# Patient Record
Sex: MALE | Race: WHITE | NOT HISPANIC OR LATINO | Employment: FULL TIME | URBAN - METROPOLITAN AREA
[De-identification: names, ages, dates, MRNs, and addresses within clinical notes are randomized per-mention and may not be internally consistent; named-entity substitution may affect disease eponyms.]

---

## 2018-04-03 PROBLEM — I73.9 PVD (PERIPHERAL VASCULAR DISEASE): Chronic | Status: ACTIVE | Noted: 2018-04-03

## 2018-04-03 PROBLEM — E11.9 INSULIN DEPENDENT TYPE 2 DIABETES MELLITUS: Chronic | Status: ACTIVE | Noted: 2018-04-03

## 2018-04-03 PROBLEM — I10 ESSENTIAL HYPERTENSION: Chronic | Status: ACTIVE | Noted: 2018-04-03

## 2018-04-03 PROBLEM — Z79.4 INSULIN DEPENDENT TYPE 2 DIABETES MELLITUS: Chronic | Status: ACTIVE | Noted: 2018-04-03

## 2018-04-03 PROBLEM — E78.2 MIXED HYPERLIPIDEMIA: Chronic | Status: ACTIVE | Noted: 2018-04-03

## 2018-05-22 PROBLEM — E66.01 MORBID OBESITY: Status: ACTIVE | Noted: 2017-06-15

## 2018-05-22 PROBLEM — M54.50 LOW BACK PAIN: Status: ACTIVE | Noted: 2017-06-15

## 2018-05-22 PROBLEM — M51.369 DEGENERATION OF LUMBAR INTERVERTEBRAL DISC: Status: ACTIVE | Noted: 2017-06-21

## 2018-05-22 PROBLEM — M51.36 DEGENERATION OF LUMBAR INTERVERTEBRAL DISC: Status: ACTIVE | Noted: 2017-06-21

## 2018-09-07 PROBLEM — G62.9 NEUROPATHY: Chronic | Status: ACTIVE | Noted: 2018-09-07

## 2021-03-30 ENCOUNTER — IMMUNIZATION (OUTPATIENT)
Dept: PRIMARY CARE CLINIC | Facility: CLINIC | Age: 56
End: 2021-03-30
Payer: COMMERCIAL

## 2021-03-30 DIAGNOSIS — Z23 NEED FOR VACCINATION: Primary | ICD-10-CM

## 2021-03-30 PROCEDURE — 91301 PR SARS-COV-2 COVID-19 VACCINE, NO PRSV, 100MCG/0.5ML, IM: ICD-10-PCS | Mod: S$GLB,,, | Performed by: INTERNAL MEDICINE

## 2021-03-30 PROCEDURE — 91301 PR SARS-COV-2 COVID-19 VACCINE, NO PRSV, 100MCG/0.5ML, IM: CPT | Mod: S$GLB,,, | Performed by: INTERNAL MEDICINE

## 2021-03-30 PROCEDURE — 0011A PR IMMUNIZ ADMIN, SARS-COV-2 COVID-19 VACC, 100MCG/0.5ML, 1ST DOSE: CPT | Mod: CV19,S$GLB,, | Performed by: INTERNAL MEDICINE

## 2021-03-30 PROCEDURE — 0011A PR IMMUNIZ ADMIN, SARS-COV-2 COVID-19 VACC, 100MCG/0.5ML, 1ST DOSE: ICD-10-PCS | Mod: CV19,S$GLB,, | Performed by: INTERNAL MEDICINE

## 2021-03-30 RX ADMIN — Medication 0.5 ML: at 01:03

## 2021-04-28 ENCOUNTER — IMMUNIZATION (OUTPATIENT)
Dept: PRIMARY CARE CLINIC | Facility: CLINIC | Age: 56
End: 2021-04-28
Payer: COMMERCIAL

## 2021-04-28 DIAGNOSIS — Z23 NEED FOR VACCINATION: Primary | ICD-10-CM

## 2021-04-28 PROCEDURE — 0012A PR IMMUNIZ ADMIN, SARS-COV-2 COVID-19 VACC, 100MCG/0.5ML, 2ND DOSE: ICD-10-PCS | Mod: CV19,S$GLB,, | Performed by: INTERNAL MEDICINE

## 2021-04-28 PROCEDURE — 91301 PR SARS-COV-2 COVID-19 VACCINE, NO PRSV, 100MCG/0.5ML, IM: ICD-10-PCS | Mod: S$GLB,,, | Performed by: INTERNAL MEDICINE

## 2021-04-28 PROCEDURE — 0012A PR IMMUNIZ ADMIN, SARS-COV-2 COVID-19 VACC, 100MCG/0.5ML, 2ND DOSE: CPT | Mod: CV19,S$GLB,, | Performed by: INTERNAL MEDICINE

## 2021-04-28 PROCEDURE — 91301 PR SARS-COV-2 COVID-19 VACCINE, NO PRSV, 100MCG/0.5ML, IM: CPT | Mod: S$GLB,,, | Performed by: INTERNAL MEDICINE

## 2021-04-28 RX ADMIN — Medication 0.5 ML: at 06:04

## 2023-08-02 ENCOUNTER — LAB VISIT (OUTPATIENT)
Dept: LAB | Facility: HOSPITAL | Age: 58
End: 2023-08-02
Attending: GENERAL PRACTICE
Payer: COMMERCIAL

## 2023-08-02 DIAGNOSIS — L40.3 SAPHO SYNDROME: Primary | ICD-10-CM

## 2023-08-02 DIAGNOSIS — E11.621 DIABETIC FOOT ULCER WITH OSTEOMYELITIS: ICD-10-CM

## 2023-08-02 DIAGNOSIS — M65.9 SAPHO SYNDROME: Primary | ICD-10-CM

## 2023-08-02 DIAGNOSIS — L70.9 SAPHO SYNDROME: Primary | ICD-10-CM

## 2023-08-02 DIAGNOSIS — E11.69 DIABETIC FOOT ULCER WITH OSTEOMYELITIS: ICD-10-CM

## 2023-08-02 DIAGNOSIS — M85.80 SAPHO SYNDROME: Primary | ICD-10-CM

## 2023-08-02 DIAGNOSIS — L97.509 DIABETIC FOOT ULCER WITH OSTEOMYELITIS: ICD-10-CM

## 2023-08-02 DIAGNOSIS — M86.9 DIABETIC FOOT ULCER WITH OSTEOMYELITIS: ICD-10-CM

## 2023-08-02 DIAGNOSIS — M86.9 SAPHO SYNDROME: Primary | ICD-10-CM

## 2023-08-02 LAB
ALBUMIN SERPL BCP-MCNC: 2.7 G/DL (ref 3.5–5.2)
ALP SERPL-CCNC: 77 U/L (ref 55–135)
ALT SERPL W/O P-5'-P-CCNC: 12 U/L (ref 10–44)
ANION GAP SERPL CALC-SCNC: 10 MMOL/L (ref 8–16)
AST SERPL-CCNC: 17 U/L (ref 10–40)
BASOPHILS # BLD AUTO: 0.02 K/UL (ref 0–0.2)
BASOPHILS NFR BLD: 0.3 % (ref 0–1.9)
BILIRUB SERPL-MCNC: 0.3 MG/DL (ref 0.1–1)
BUN SERPL-MCNC: 38 MG/DL (ref 6–20)
CALCIUM SERPL-MCNC: 8.6 MG/DL (ref 8.7–10.5)
CHLORIDE SERPL-SCNC: 103 MMOL/L (ref 95–110)
CO2 SERPL-SCNC: 27 MMOL/L (ref 23–29)
CREAT SERPL-MCNC: 1.6 MG/DL (ref 0.5–1.4)
CRP SERPL-MCNC: 31.6 MG/L (ref 0–8.2)
DIFFERENTIAL METHOD: ABNORMAL
EOSINOPHIL # BLD AUTO: 0.3 K/UL (ref 0–0.5)
EOSINOPHIL NFR BLD: 5 % (ref 0–8)
ERYTHROCYTE [DISTWIDTH] IN BLOOD BY AUTOMATED COUNT: 15.9 % (ref 11.5–14.5)
EST. GFR  (NO RACE VARIABLE): 50 ML/MIN/1.73 M^2
GLUCOSE SERPL-MCNC: 110 MG/DL (ref 70–110)
HCT VFR BLD AUTO: 33.4 % (ref 40–54)
HGB BLD-MCNC: 9.9 G/DL (ref 14–18)
IMM GRANULOCYTES # BLD AUTO: 0.06 K/UL (ref 0–0.04)
IMM GRANULOCYTES NFR BLD AUTO: 1 % (ref 0–0.5)
LYMPHOCYTES # BLD AUTO: 1.3 K/UL (ref 1–4.8)
LYMPHOCYTES NFR BLD: 20.6 % (ref 18–48)
MCH RBC QN AUTO: 22.3 PG (ref 27–31)
MCHC RBC AUTO-ENTMCNC: 29.6 G/DL (ref 32–36)
MCV RBC AUTO: 75 FL (ref 82–98)
MONOCYTES # BLD AUTO: 0.8 K/UL (ref 0.3–1)
MONOCYTES NFR BLD: 12.2 % (ref 4–15)
NEUTROPHILS # BLD AUTO: 3.8 K/UL (ref 1.8–7.7)
NEUTROPHILS NFR BLD: 60.9 % (ref 38–73)
NRBC BLD-RTO: 0 /100 WBC
PLATELET # BLD AUTO: 169 K/UL (ref 150–450)
PMV BLD AUTO: 12 FL (ref 9.2–12.9)
POTASSIUM SERPL-SCNC: 5.1 MMOL/L (ref 3.5–5.1)
PROT SERPL-MCNC: 7.3 G/DL (ref 6–8.4)
RBC # BLD AUTO: 4.43 M/UL (ref 4.6–6.2)
SODIUM SERPL-SCNC: 140 MMOL/L (ref 136–145)
WBC # BLD AUTO: 6.22 K/UL (ref 3.9–12.7)

## 2023-08-02 PROCEDURE — 85025 COMPLETE CBC W/AUTO DIFF WBC: CPT | Performed by: GENERAL PRACTICE

## 2023-08-02 PROCEDURE — 36415 COLL VENOUS BLD VENIPUNCTURE: CPT | Performed by: GENERAL PRACTICE

## 2023-08-02 PROCEDURE — 86140 C-REACTIVE PROTEIN: CPT | Performed by: GENERAL PRACTICE

## 2023-08-02 PROCEDURE — 80053 COMPREHEN METABOLIC PANEL: CPT | Performed by: GENERAL PRACTICE

## 2023-08-07 ENCOUNTER — LAB VISIT (OUTPATIENT)
Dept: LAB | Facility: HOSPITAL | Age: 58
End: 2023-08-07
Attending: PODIATRIST
Payer: COMMERCIAL

## 2023-08-07 DIAGNOSIS — E11.610 DIABETIC NEUROGENIC ARTHROPATHY: ICD-10-CM

## 2023-08-07 DIAGNOSIS — Z47.89 UNSPECIFIED ORTHOPEDIC AFTERCARE: Primary | ICD-10-CM

## 2023-08-07 LAB
ANION GAP SERPL CALC-SCNC: 12 MMOL/L (ref 8–16)
BASOPHILS # BLD AUTO: 0.02 K/UL (ref 0–0.2)
BASOPHILS NFR BLD: 0.3 % (ref 0–1.9)
BUN SERPL-MCNC: 36 MG/DL (ref 6–20)
CALCIUM SERPL-MCNC: 9 MG/DL (ref 8.7–10.5)
CHLORIDE SERPL-SCNC: 103 MMOL/L (ref 95–110)
CO2 SERPL-SCNC: 26 MMOL/L (ref 23–29)
CREAT SERPL-MCNC: 1.5 MG/DL (ref 0.5–1.4)
CRP SERPL-MCNC: 13.6 MG/L (ref 0–8.2)
DIFFERENTIAL METHOD: ABNORMAL
EOSINOPHIL # BLD AUTO: 0.2 K/UL (ref 0–0.5)
EOSINOPHIL NFR BLD: 2.2 % (ref 0–8)
ERYTHROCYTE [DISTWIDTH] IN BLOOD BY AUTOMATED COUNT: 16.5 % (ref 11.5–14.5)
EST. GFR  (NO RACE VARIABLE): 54 ML/MIN/1.73 M^2
GLUCOSE SERPL-MCNC: 82 MG/DL (ref 70–110)
HCT VFR BLD AUTO: 36.3 % (ref 40–54)
HGB BLD-MCNC: 10.7 G/DL (ref 14–18)
IMM GRANULOCYTES # BLD AUTO: 0.09 K/UL (ref 0–0.04)
IMM GRANULOCYTES NFR BLD AUTO: 1.2 % (ref 0–0.5)
LYMPHOCYTES # BLD AUTO: 1.2 K/UL (ref 1–4.8)
LYMPHOCYTES NFR BLD: 16.4 % (ref 18–48)
MCH RBC QN AUTO: 22.3 PG (ref 27–31)
MCHC RBC AUTO-ENTMCNC: 29.5 G/DL (ref 32–36)
MCV RBC AUTO: 76 FL (ref 82–98)
MONOCYTES # BLD AUTO: 0.7 K/UL (ref 0.3–1)
MONOCYTES NFR BLD: 9.8 % (ref 4–15)
NEUTROPHILS # BLD AUTO: 5.1 K/UL (ref 1.8–7.7)
NEUTROPHILS NFR BLD: 70.1 % (ref 38–73)
NRBC BLD-RTO: 0 /100 WBC
PLATELET # BLD AUTO: 211 K/UL (ref 150–450)
PMV BLD AUTO: 11.2 FL (ref 9.2–12.9)
POTASSIUM SERPL-SCNC: 4.6 MMOL/L (ref 3.5–5.1)
RBC # BLD AUTO: 4.79 M/UL (ref 4.6–6.2)
SODIUM SERPL-SCNC: 141 MMOL/L (ref 136–145)
WBC # BLD AUTO: 7.25 K/UL (ref 3.9–12.7)

## 2023-08-07 PROCEDURE — 85025 COMPLETE CBC W/AUTO DIFF WBC: CPT | Performed by: PODIATRIST

## 2023-08-07 PROCEDURE — 86140 C-REACTIVE PROTEIN: CPT | Performed by: PODIATRIST

## 2023-08-07 PROCEDURE — 36415 COLL VENOUS BLD VENIPUNCTURE: CPT | Performed by: PODIATRIST

## 2023-08-07 PROCEDURE — 80048 BASIC METABOLIC PNL TOTAL CA: CPT | Performed by: PODIATRIST

## 2023-09-03 PROBLEM — J15.9 BACTERIAL PNEUMONIA: Status: ACTIVE | Noted: 2023-09-03

## 2023-09-04 PROBLEM — J90 BILATERAL PLEURAL EFFUSION: Status: ACTIVE | Noted: 2023-09-04

## 2023-09-04 PROBLEM — I50.33 ACUTE ON CHRONIC DIASTOLIC HEART FAILURE: Status: ACTIVE | Noted: 2023-09-04

## 2023-09-04 PROBLEM — J96.01 ACUTE RESPIRATORY FAILURE WITH HYPOXIA AND HYPERCAPNIA: Status: ACTIVE | Noted: 2023-09-04

## 2023-09-04 PROBLEM — J96.02 ACUTE RESPIRATORY FAILURE WITH HYPOXIA AND HYPERCAPNIA: Status: ACTIVE | Noted: 2023-09-04

## 2023-09-04 PROBLEM — I50.9 NEW ONSET OF CONGESTIVE HEART FAILURE: Status: ACTIVE | Noted: 2023-09-04

## 2023-09-05 PROBLEM — I50.33 ACUTE ON CHRONIC HEART FAILURE WITH PRESERVED EJECTION FRACTION (HFPEF): Status: ACTIVE | Noted: 2023-09-05

## 2023-09-05 PROBLEM — I47.29 NSVT (NONSUSTAINED VENTRICULAR TACHYCARDIA): Status: ACTIVE | Noted: 2023-09-05

## 2023-09-06 PROBLEM — J96.22 ACUTE ON CHRONIC RESPIRATORY FAILURE WITH HYPOXIA AND HYPERCAPNIA: Status: ACTIVE | Noted: 2023-09-06

## 2023-09-06 PROBLEM — I47.29 NSVT (NONSUSTAINED VENTRICULAR TACHYCARDIA): Status: RESOLVED | Noted: 2023-09-05 | Resolved: 2023-09-06

## 2023-09-06 PROBLEM — J90 BILATERAL PLEURAL EFFUSION: Status: RESOLVED | Noted: 2023-09-04 | Resolved: 2023-09-06

## 2023-09-06 PROBLEM — I50.33 ACUTE ON CHRONIC DIASTOLIC HEART FAILURE: Status: RESOLVED | Noted: 2023-09-04 | Resolved: 2023-09-06

## 2023-09-06 PROBLEM — J96.21 ACUTE ON CHRONIC RESPIRATORY FAILURE WITH HYPOXIA AND HYPERCAPNIA: Status: ACTIVE | Noted: 2023-09-06

## 2023-09-06 PROBLEM — J96.01 ACUTE RESPIRATORY FAILURE WITH HYPOXIA AND HYPERCAPNIA: Status: RESOLVED | Noted: 2023-09-04 | Resolved: 2023-09-06

## 2023-09-06 PROBLEM — J96.02 ACUTE RESPIRATORY FAILURE WITH HYPOXIA AND HYPERCAPNIA: Status: RESOLVED | Noted: 2023-09-04 | Resolved: 2023-09-06

## 2023-09-07 ENCOUNTER — DOCUMENT SCAN (OUTPATIENT)
Dept: HOME HEALTH SERVICES | Facility: HOSPITAL | Age: 58
End: 2023-09-07
Payer: COMMERCIAL

## 2023-09-07 ENCOUNTER — PATIENT OUTREACH (OUTPATIENT)
Dept: ADMINISTRATIVE | Facility: CLINIC | Age: 58
End: 2023-09-07
Payer: COMMERCIAL

## 2023-09-07 NOTE — PROGRESS NOTES
C3 nurse spoke with Farhan Crenshaw  for a TCC post hospital discharge follow up call. The patient does not have a scheduled HOSFU appointment with Rosa Michael MD  within 5-7 days post hospital discharge date 09/06/2023. C3 nurse was unable to schedule HOSFU appointment in [facility].    Message sent to PCP Staff.

## 2023-12-04 PROBLEM — J15.9 BACTERIAL PNEUMONIA: Status: RESOLVED | Noted: 2023-09-03 | Resolved: 2023-12-04

## 2023-12-11 PROBLEM — J96.22 ACUTE ON CHRONIC RESPIRATORY FAILURE WITH HYPOXIA AND HYPERCAPNIA: Status: RESOLVED | Noted: 2023-09-06 | Resolved: 2023-12-11

## 2023-12-11 PROBLEM — J96.21 ACUTE ON CHRONIC RESPIRATORY FAILURE WITH HYPOXIA AND HYPERCAPNIA: Status: RESOLVED | Noted: 2023-09-06 | Resolved: 2023-12-11

## 2023-12-17 PROBLEM — R79.89 ELEVATED TROPONIN: Status: ACTIVE | Noted: 2023-12-17

## 2023-12-17 PROBLEM — I16.1 HYPERTENSIVE EMERGENCY: Status: ACTIVE | Noted: 2023-12-17

## 2023-12-18 PROBLEM — I50.31 ACUTE DIASTOLIC HEART FAILURE: Status: ACTIVE | Noted: 2023-12-18

## 2024-01-05 ENCOUNTER — OFFICE VISIT (OUTPATIENT)
Dept: CARDIOLOGY | Facility: CLINIC | Age: 59
End: 2024-01-05
Payer: COMMERCIAL

## 2024-01-05 VITALS
BODY MASS INDEX: 41.75 KG/M2 | SYSTOLIC BLOOD PRESSURE: 140 MMHG | HEART RATE: 74 BPM | WEIGHT: 315 LBS | DIASTOLIC BLOOD PRESSURE: 70 MMHG | HEIGHT: 73 IN | OXYGEN SATURATION: 95 %

## 2024-01-05 DIAGNOSIS — R94.39 ABNORMAL CARDIOVASCULAR STRESS TEST: ICD-10-CM

## 2024-01-05 DIAGNOSIS — I50.32 CHRONIC HEART FAILURE WITH PRESERVED EJECTION FRACTION: ICD-10-CM

## 2024-01-05 DIAGNOSIS — E78.2 MIXED HYPERLIPIDEMIA: Chronic | ICD-10-CM

## 2024-01-05 DIAGNOSIS — I73.9 PVD (PERIPHERAL VASCULAR DISEASE): Primary | Chronic | ICD-10-CM

## 2024-01-05 DIAGNOSIS — R79.89 ELEVATED TROPONIN: ICD-10-CM

## 2024-01-05 DIAGNOSIS — I10 ESSENTIAL HYPERTENSION: Chronic | ICD-10-CM

## 2024-01-05 DIAGNOSIS — N18.32 STAGE 3B CHRONIC KIDNEY DISEASE: ICD-10-CM

## 2024-01-05 PROCEDURE — 1159F MED LIST DOCD IN RCRD: CPT | Mod: CPTII,S$GLB,, | Performed by: INTERNAL MEDICINE

## 2024-01-05 PROCEDURE — 3078F DIAST BP <80 MM HG: CPT | Mod: CPTII,S$GLB,, | Performed by: INTERNAL MEDICINE

## 2024-01-05 PROCEDURE — 3008F BODY MASS INDEX DOCD: CPT | Mod: CPTII,S$GLB,, | Performed by: INTERNAL MEDICINE

## 2024-01-05 PROCEDURE — 99215 OFFICE O/P EST HI 40 MIN: CPT | Mod: S$GLB,,, | Performed by: INTERNAL MEDICINE

## 2024-01-05 PROCEDURE — 3077F SYST BP >= 140 MM HG: CPT | Mod: CPTII,S$GLB,, | Performed by: INTERNAL MEDICINE

## 2024-01-05 PROCEDURE — 1160F RVW MEDS BY RX/DR IN RCRD: CPT | Mod: CPTII,S$GLB,, | Performed by: INTERNAL MEDICINE

## 2024-01-05 PROCEDURE — 99999 PR PBB SHADOW E&M-EST. PATIENT-LVL V: CPT | Mod: PBBFAC,,, | Performed by: INTERNAL MEDICINE

## 2024-01-05 PROCEDURE — 1111F DSCHRG MED/CURRENT MED MERGE: CPT | Mod: CPTII,S$GLB,, | Performed by: INTERNAL MEDICINE

## 2024-01-05 NOTE — PROGRESS NOTES
[facility] [address]  Cardiology Clinic Note      Chief Complaint  Chief Complaint   Patient presents with    [facility]       HPI:      58-year-old male with past medical history presumed CKD though the creatinine values we have our probably all from hospitalizations, hypertension, hyperlipidemia, diabetes, peripheral vascular disease no documentation, no evidence of renal artery stenosis ultrasound 2018, NSVT during last hospital stay, MPI 04/2022 no ischemia possible inferolateral scar, coronary artery calcification by imaging 2022, HFpEF echocardiogram 12/18/2023 normal EF mild LVH grade 2 diastolic dysfunction moderate left atrial dilation aortic valve sclerosis/mild stenosis mean gradient 10 peak velocity 2.2 normal RV prior echo 09/2023 EF 50-55% severe LVH diastolic function indeterminate mild aortic valve sclerosis/stenosis peak velocity 2 mean gradient 9 RV not well seen outside echocardiogram 02/2023 normal EF mild LVH mild-to-moderate left atrial enlargement    Admitted for acute on hypertensive urgency/emergency with acute on chronic chronic diastolic heart failure 12/2023 complicated by demand ischemia  Note made of apparent lead abnormal MPI in the past  Patient feels improved though still suffers with lymphedema    Medications  Current Outpatient Medications   Medication Sig Dispense Refill    albuterol (VENTOLIN HFA) 90 mcg/actuation inhaler Inhale 2 puffs into the lungs every 6 (six) hours as needed for Wheezing or Shortness of Breath. Rescue 1 g 2    amLODIPine (NORVASC) 10 MG tablet Take 10 mg by mouth once daily.      aspirin (ECOTRIN) 81 MG EC tablet TAKE 1 TABLET(S) EVERY DAY BY ORAL ROUTE.  11    atorvastatin (LIPITOR) 80 MG tablet TAKE 1 TABLET BY MOUTH EVERY DAY 15 tablet 0    BASAGLAR KWIKPEN U-100 INSULIN glargine 100 units/mL (3mL) SubQ pen INJECT 20 UNITS INTO THE SKIN ONCE DAILY. (Patient taking differently: 20 Units 2 (two) times a day.) 15 Syringe 0    carvediloL  "(COREG) 25 MG tablet Take 25 mg by mouth 2 (two) times daily.      furosemide (LASIX) 80 MG tablet Take 1 tablet (80 mg total) by mouth once daily. 90 tablet 0    hydrALAZINE (APRESOLINE) 25 MG tablet Take 25 mg by mouth 2 (two) times a day.      isosorbide dinitrate (ISORDIL) 20 MG tablet Take 20 mg by mouth 2 (two) times daily.      lisinopriL (PRINIVIL,ZESTRIL) 40 MG tablet Take 40 mg by mouth once daily.      metFORMIN (GLUCOPHAGE) 1000 MG tablet Take 1,000 mg by mouth 2 (two) times daily with meals.      BD ULTRA-FINE KAMAR PEN NEEDLE 32 gauge x 5/32" Ndle USE TO INJECT BASAGLAR ONCE A  each 2     No current facility-administered medications for this visit.        History  Past Medical History:   Diagnosis Date    Acute respiratory failure 12/17/2023    Arthritis     Bacterial pneumonia 09/2023    CHF (congestive heart failure)     Diabetes mellitus, type 2     Hyperlipidemia     Hypertension      Past Surgical History:   Procedure Laterality Date    ADENOIDECTOMY  1975    APPENDECTOMY  1995    TONSILLECTOMY  1975     Social History     Socioeconomic History    Marital status:     Number of children: 2   Occupational History    Occupation: Retail Sales    Tobacco Use    Smoking status: Never    Smokeless tobacco: Never   Substance and Sexual Activity    Alcohol use: No    Drug use: No     Social Determinants of Health     Financial Resource Strain: Low Risk  (12/19/2023)    Overall Financial Resource Strain (CARDIA)     Difficulty of Paying Living Expenses: Not very hard   Food Insecurity: No Food Insecurity (12/19/2023)    Hunger Vital Sign     Worried About Running Out of Food in the Last Year: Never true     Ran Out of Food in the Last Year: Never true   Transportation Needs: No Transportation Needs (12/19/2023)    PRAPARE - Transportation     Lack of Transportation (Medical): No     Lack of Transportation (Non-Medical): No   Stress: No Stress Concern Present (12/19/2023)    [facility] " of Occupational Health - Occupational Stress Questionnaire     Feeling of Stress : Only a little   Social Connections: Moderately Integrated (12/19/2023)    Social Connection and Isolation Panel [NHANES]     Frequency of Communication with Friends and Family: Three times a week     Frequency of Social Gatherings with Friends and Family: Three times a week     Attends Sikh Services: 1 to 4 times per year     Active Member of Clubs or Organizations: Yes     Attends Club or Organization Meetings: 1 to 4 times per year     Marital Status:    Housing Stability: Low Risk  (12/19/2023)    Housing Stability Vital Sign     Unable to Pay for Housing in the Last Year: No     Number of Places Lived in the Last Year: 1     Unstable Housing in the Last Year: No     Family History   Problem Relation Age of Onset    Arthritis Mother     Cancer Mother         lymph node     Hypertension Mother     Cancer Father         lung        Allergies  Review of patient's allergies indicates:  No Known Allergies    Review of Systems   Review of Systems   Constitutional: Negative for fever.   HENT:  Negative for nosebleeds.    Eyes:  Negative for visual disturbance.   Cardiovascular:  Positive for leg swelling. Negative for chest pain, claudication, dyspnea on exertion, palpitations and syncope.   Respiratory:  Negative for cough, hemoptysis and wheezing.    Endocrine: Negative for cold intolerance, heat intolerance, polyphagia and polyuria.   Hematologic/Lymphatic: Negative for bleeding problem.   Skin:  Negative for rash.   Musculoskeletal:  Negative for myalgias.   Gastrointestinal:  Negative for hematemesis, hematochezia, nausea and vomiting.   Genitourinary:  Negative for dysuria.   Neurological:  Negative for focal weakness and sensory change.   Psychiatric/Behavioral:  Negative for altered mental status.        Physical Exam  Vitals:    01/05/24 1337   BP: (!) 140/70   Pulse: 74     Wt Readings from Last 1 Encounters:    01/05/24 (!) 167.8 kg (370 lb)     Physical Exam  HENT:      Head: Normocephalic and atraumatic.      Mouth/Throat:      Mouth: Mucous membranes are moist.   Eyes:      Extraocular Movements: Extraocular movements intact.      Pupils: Pupils are equal, round, and reactive to light.   Neck:      Vascular: No carotid bruit or JVD.   Cardiovascular:      Rate and Rhythm: Normal rate and regular rhythm.      Pulses: Normal pulses and intact distal pulses.      Heart sounds: Normal heart sounds. No murmur heard.     No friction rub. No gallop.   Pulmonary:      Effort: Pulmonary effort is normal.      Breath sounds: Normal breath sounds.   Abdominal:      Tenderness: There is no abdominal tenderness. There is no guarding or rebound.   Musculoskeletal:      Right lower leg: Edema present.      Left lower leg: Edema present.   Skin:     General: Skin is warm and dry.      Capillary Refill: Capillary refill takes less than 2 seconds.   Neurological:      General: No focal deficit present.      Mental Status: He is alert and oriented to person, place, and time.   Psychiatric:         Mood and Affect: Mood normal.         Labs  No results displayed because visit has over 200 results.      No results displayed because visit has over 200 results.      Lab Visit on 08/07/2023   Component Date Value Ref Range Status    Sodium 08/07/2023 141  136 - 145 mmol/L Final    Potassium 08/07/2023 4.6  3.5 - 5.1 mmol/L Final    Chloride 08/07/2023 103  95 - 110 mmol/L Final    CO2 08/07/2023 26  23 - 29 mmol/L Final    Glucose 08/07/2023 82  70 - 110 mg/dL Final    BUN 08/07/2023 36 (H)  6 - 20 mg/dL Final    Creatinine 08/07/2023 1.5 (H)  0.5 - 1.4 mg/dL Final    Calcium 08/07/2023 9.0  8.7 - 10.5 mg/dL Final    Anion Gap 08/07/2023 12  8 - 16 mmol/L Final    eGFR 08/07/2023 54 (A)  >60 mL/min/1.73 m^2 Final    WBC 08/07/2023 7.25  3.90 - 12.70 K/uL Final    RBC 08/07/2023 4.79  4.60 - 6.20 M/uL Final    Hemoglobin 08/07/2023 10.7 (L)   14.0 - 18.0 g/dL Final    Hematocrit 08/07/2023 36.3 (L)  40.0 - 54.0 % Final    MCV 08/07/2023 76 (L)  82 - 98 fL Final    MCH 08/07/2023 22.3 (L)  27.0 - 31.0 pg Final    MCHC 08/07/2023 29.5 (L)  32.0 - 36.0 g/dL Final    RDW 08/07/2023 16.5 (H)  11.5 - 14.5 % Final    Platelets 08/07/2023 211  150 - 450 K/uL Final    MPV 08/07/2023 11.2  9.2 - 12.9 fL Final    Immature Granulocytes 08/07/2023 1.2 (H)  0.0 - 0.5 % Final    Gran # (ANC) 08/07/2023 5.1  1.8 - 7.7 K/uL Final    Immature Grans (Abs) 08/07/2023 0.09 (H)  0.00 - 0.04 K/uL Final    Comment: Mild elevation in immature granulocytes is non specific and   can be seen in a variety of conditions including stress response,   acute inflammation, trauma and pregnancy. Correlation with other   laboratory and clinical findings is essential.      Lymph # 08/07/2023 1.2  1.0 - 4.8 K/uL Final    Mono # 08/07/2023 0.7  0.3 - 1.0 K/uL Final    Eos # 08/07/2023 0.2  0.0 - 0.5 K/uL Final    Baso # 08/07/2023 0.02  0.00 - 0.20 K/uL Final    nRBC 08/07/2023 0  0 /100 WBC Final    Gran % 08/07/2023 70.1  38.0 - 73.0 % Final    Lymph % 08/07/2023 16.4 (L)  18.0 - 48.0 % Final    Mono % 08/07/2023 9.8  4.0 - 15.0 % Final    Eosinophil % 08/07/2023 2.2  0.0 - 8.0 % Final    Basophil % 08/07/2023 0.3  0.0 - 1.9 % Final    Differential Method 08/07/2023 Automated   Final    CRP 08/07/2023 13.6 (H)  0.0 - 8.2 mg/L Final   Lab Visit on 08/02/2023   Component Date Value Ref Range Status    WBC 08/02/2023 6.22  3.90 - 12.70 K/uL Final    RBC 08/02/2023 4.43 (L)  4.60 - 6.20 M/uL Final    Hemoglobin 08/02/2023 9.9 (L)  14.0 - 18.0 g/dL Final    Hematocrit 08/02/2023 33.4 (L)  40.0 - 54.0 % Final    MCV 08/02/2023 75 (L)  82 - 98 fL Final    MCH 08/02/2023 22.3 (L)  27.0 - 31.0 pg Final    MCHC 08/02/2023 29.6 (L)  32.0 - 36.0 g/dL Final    RDW 08/02/2023 15.9 (H)  11.5 - 14.5 % Final    Platelets 08/02/2023 169  150 - 450 K/uL Final    MPV 08/02/2023 12.0  9.2 - 12.9 fL Final     Immature Granulocytes 08/02/2023 1.0 (H)  0.0 - 0.5 % Final    Gran # (ANC) 08/02/2023 3.8  1.8 - 7.7 K/uL Final    Immature Grans (Abs) 08/02/2023 0.06 (H)  0.00 - 0.04 K/uL Final    Comment: Mild elevation in immature granulocytes is non specific and   can be seen in a variety of conditions including stress response,   acute inflammation, trauma and pregnancy. Correlation with other   laboratory and clinical findings is essential.      Lymph # 08/02/2023 1.3  1.0 - 4.8 K/uL Final    Mono # 08/02/2023 0.8  0.3 - 1.0 K/uL Final    Eos # 08/02/2023 0.3  0.0 - 0.5 K/uL Final    Baso # 08/02/2023 0.02  0.00 - 0.20 K/uL Final    nRBC 08/02/2023 0  0 /100 WBC Final    Gran % 08/02/2023 60.9  38.0 - 73.0 % Final    Lymph % 08/02/2023 20.6  18.0 - 48.0 % Final    Mono % 08/02/2023 12.2  4.0 - 15.0 % Final    Eosinophil % 08/02/2023 5.0  0.0 - 8.0 % Final    Basophil % 08/02/2023 0.3  0.0 - 1.9 % Final    Differential Method 08/02/2023 Automated   Final    Sodium 08/02/2023 140  136 - 145 mmol/L Final    Potassium 08/02/2023 5.1  3.5 - 5.1 mmol/L Final    Chloride 08/02/2023 103  95 - 110 mmol/L Final    CO2 08/02/2023 27  23 - 29 mmol/L Final    Glucose 08/02/2023 110  70 - 110 mg/dL Final    BUN 08/02/2023 38 (H)  6 - 20 mg/dL Final    Creatinine 08/02/2023 1.6 (H)  0.5 - 1.4 mg/dL Final    Calcium 08/02/2023 8.6 (L)  8.7 - 10.5 mg/dL Final    Total Protein 08/02/2023 7.3  6.0 - 8.4 g/dL Final    Albumin 08/02/2023 2.7 (L)  3.5 - 5.2 g/dL Final    Total Bilirubin 08/02/2023 0.3  0.1 - 1.0 mg/dL Final    Comment: For infants and newborns, interpretation of results should be based  on gestational age, weight and in agreement with clinical  observations.    Premature Infant recommended reference ranges:  Up to 24 hours.............<8.0 mg/dL  Up to 48 hours............<12.0 mg/dL  3-5 days..................<15.0 mg/dL  6-29 days.................<15.0 mg/dL      Alkaline Phosphatase 08/02/2023 77  55 - 135 U/L Final    AST  08/02/2023 17  10 - 40 U/L Final    ALT 08/02/2023 12  10 - 44 U/L Final    eGFR 08/02/2023 50 (A)  >60 mL/min/1.73 m^2 Final    Anion Gap 08/02/2023 10  8 - 16 mmol/L Final    CRP 08/02/2023 31.6 (H)  0.0 - 8.2 mg/L Final       EKG  12/2023 EKG normal sinus rhythm possible PAC with aberrant conduction left atrial enlargement nonspecific ST-T changes abnormal precordial R-wave progression overall poor quality tracing     Echo   Results for orders placed or performed during the hospital encounter of 12/17/23   Echo   Result Value Ref Range    BSA 3 m2    LVOT stroke volume 83.24 cm3    LVIDd 4.76 3.5 - 6.0 cm    LV Systolic Volume 22.87 mL    LV Systolic Volume Index 8.1 mL/m2    LVIDs 2.52 2.1 - 4.0 cm    LV Diastolic Volume 105.54 mL    LV Diastolic Volume Index 37.16 mL/m2    IVS 1.4 (A) 0.6 - 1.1 cm    LVOT diameter 2.35 cm    LVOT area 4.3 cm2    FS 47 (A) 28 - 44 %    Left Ventricle Relative Wall Thickness 0.59 cm    Posterior Wall 1.4 (A) 0.6 - 1.1 cm    LV mass 270.36 g    LV Mass Index 95 g/m2    MV Peak E Robin 1.16 m/s    TDI LATERAL 0.09 m/s    TDI SEPTAL 0.10 m/s    E/E' ratio 12.21 m/s    MV Peak A Robin 0.67 m/s    TR Max Robin 1.77 m/s    E/A ratio 1.73     E wave deceleration time 162.38 msec    LV SEPTAL E/E' RATIO 11.60 m/s    LV LATERAL E/E' RATIO 12.89 m/s    LVOT peak robin 0.83 m/s    Left Ventricular Outflow Tract Mean Velocity 0.63 cm/s    Left Ventricular Outflow Tract Mean Gradient 1.78 mmHg    RVDD 2.33 cm    LA size 4.24 cm    AV mean gradient 10 mmHg    AV peak gradient 19 mmHg    Ao peak robin 2.17 m/s    Ao VTI 47.60 cm    LVOT peak VTI 19.20 cm    AV valve area 1.75 cm²    AV Velocity Ratio 0.38     AV index (prosthetic) 0.40     FRANCOISE by Velocity Ratio 1.66 cm²    MV mean gradient 3 mmHg    MV peak gradient 7 mmHg    MV stenosis pressure 1/2 time 47.09 ms    MV valve area p 1/2 method 4.67 cm2    MV valve area by continuity eq 2.41 cm2    MV VTI 34.6 cm    Triscuspid Valve Regurgitation Peak  Gradient 13 mmHg    PV PEAK VELOCITY 1.15 m/s    PV peak gradient 5 mmHg    Pulmonary Valve Mean Velocity 0.78 m/s    Ao root annulus 3.49 cm    STJ 2.94 cm    Ascending aorta 3.33 cm    IVC diameter 1.89 cm    Mean e' 0.10 m/s    ZLVIDS -18.10     ZLVIDD -22.18     AORTIC VALVE CUSP SEPERATION 1.51 cm    TV resting pulmonary artery pressure 16 mmHg    RV TB RVSP 5 mmHg    Est. RA pres 3 mmHg    EF 60 %    Narrative      Left Ventricle: The left ventricle is normal in size. Increased   ventricular mass. Mildly increased wall thickness. There is mild   concentric hypertrophy. Normal wall motion. There is normal systolic   function. Ejection fraction by visual approximation is 60%. Grade II   diastolic dysfunction. Normal left ventricular filling pressure. Tissue   Doppler velocity is reduced. MV e' lateral velocity is 0.10 m/s. Average   E/e' ratio is 12.21.    Right Ventricle: Normal right ventricular cavity size. Wall thickness   is normal. Right ventricle wall motion  is normal. Systolic function is   normal.    Left Atrium: Left atrium is moderately dilated.    Aortic Valve: The aortic valve is a trileaflet valve. There is moderate   aortic valve sclerosis. Moderately restricted motion.    Mitral Valve: There is mild posterior leaflet sclerosis.    IVC/SVC: Normal venous pressure at 3 mmHg.         Imaging  US Retroperitoneal Complete    Result Date: 12/19/2023  EXAMINATION: US RETROPERITONEAL COMPLETE CLINICAL HISTORY: Renal dysfunction; TECHNIQUE: Ultrasound of the kidneys and urinary bladder was performed including color flow and Doppler evaluation of the kidneys. COMPARISON: None. FINDINGS: Right kidney: The right kidney measures 12.7 cm in length.  No cortical thinning. No loss of corticomedullary distinction. Resistive index measures 0.75.  There is a 6 mm cyst within the mid to lower right kidney.  No solid renal masses are identified.  No renal stone. No hydronephrosis. Left kidney: The left kidney  measures 13.1 cm in length.  No cortical thinning. No loss of corticomedullary distinction. Resistive index measures 0.81.  No mass. There are a couple of subcentimeter echogenic foci within the upper pole of the left kidney measuring up to 5 mm in size which may represent nonobstructing calculi.  No hydronephrosis. The bladder is partially distended at the time of scanning and has an unremarkable appearance.  Prevoid bladder measurements suggest a prevoid bladder volume of 197 mL. Incidental note is made of an elongated right lobe of the liver measuring up to 22.2 cm in length.  A right pleural effusion is also present.     Subcentimeter right renal cyst. Possible nonobstructing subcentimeter left renal calculi. Right pleural effusion. Hepatomegaly with elongated right lobe of the liver. Electronically signed by: Catalino Santizo MD Date:    12/19/2023 Time:    09:08    Echo    Result Date: 12/18/2023    Left Ventricle: The left ventricle is normal in size. Increased ventricular mass. Mildly increased wall thickness. There is mild concentric hypertrophy. Normal wall motion. There is normal systolic function. Ejection fraction by visual approximation is 60%. Grade II diastolic dysfunction. Normal left ventricular filling pressure. Tissue Doppler velocity is reduced. MV e' lateral velocity is 0.10 m/s. Average E/e' ratio is 12.21.   Right Ventricle: Normal right ventricular cavity size. Wall thickness is normal. Right ventricle wall motion  is normal. Systolic function is normal.   Left Atrium: Left atrium is moderately dilated.   Aortic Valve: The aortic valve is a trileaflet valve. There is moderate aortic valve sclerosis. Moderately restricted motion.   Mitral Valve: There is mild posterior leaflet sclerosis.   IVC/SVC: Normal venous pressure at 3 mmHg.     X-Ray Chest AP Portable    Result Date: 12/17/2023  EXAMINATION: XR CHEST AP PORTABLE CLINICAL HISTORY: Sepsis; TECHNIQUE: Single frontal view of the chest was  performed. COMPARISON: Chest radiograph and CTA chest 09/03/2023. FINDINGS: Cardiac monitoring leads overlie the chest.  The lung once again demonstrates multifocal patchy and consolidative opacities with more confluent opacification of the right mid and lower lung zones.  Similar findings were noted on prior radiographic exams referenced above noting the overall extent of opacification appears slightly progressed in the right mid lower lung zones.  No evidence of new or enlarging pneumothorax.  Overall, findings highly concerning for underlying multifocal infectious process.  Osseous structures are intact. This report was flagged in Epic as abnormal.     Please see above. Electronically signed by: Lyssa Day MD Date:    12/17/2023 Time:    05:18      Prior coronary angiogram / intervention:  See HPI    Assessment and Plan  1. PVD (peripheral vascular disease)  Consider lower extremity arterial duplex next visit if unable to find records    2. Mixed hyperlipidemia  Continue statin    3. Essential hypertension  Continue amlodipine, carvedilol, furosemide, hydralazine, nitrate  Blood pressure log call me if systolic greater than 140 can make hydralazine t.i.d. if necessary    4. Elevated troponin  Likely demand in the hospital    5. Abnormal cardiovascular stress test  Noted on prior study the patient is on aspirin 81 and Lipitor 80 asymptomatic  Can consider repeat in the future with symptoms    6. Stage 3b chronic kidney disease  Refer to nephrology  - Ambulatory referral/consult to Nephrology; Future    7. HFpEF  Wt Readings from Last 3 Encounters:   01/05/24 1337 (!) 167.8 kg (370 lb)   12/18/23 1300 (!) 174.6 kg (385 lb)   12/17/23 0519 (!) 174.7 kg (385 lb 3.2 oz)   12/17/23 0445 (!) 170.1 kg (375 lb)   09/05/23 0754 (!) 160.1 kg (353 lb)   09/04/23 0203 (!) 160.5 kg (353 lb 13.4 oz)   09/03/23 1949 (!) 154.2 kg (340 lb)   Continue Lasix      Follow Up  Follow up in about 3 months (around 4/5/2024).      Arturo  CHAD Sanchez MD, FAC, [facility]  Interventional Cardiology     Total professional time spent for the encounter: 40 minutes  Time was spent preparing to see the patient, reviewing results of prior testing, obtaining and/or reviewing separately obtained history, performing a medically appropriate examination and interview, counseling and educating the patient/family, ordering medications/tests/procedures, referring and communicating with other health care professionals, documenting clinical information in the electronic health record, and independently interpreting results.

## 2024-01-23 PROBLEM — J15.5 PNEUMONIA DUE TO ESCHERICHIA COLI: Status: ACTIVE | Noted: 2024-01-23

## 2024-01-23 PROBLEM — E66.2 OBESITY HYPOVENTILATION SYNDROME: Status: ACTIVE | Noted: 2024-01-23

## 2024-01-25 PROBLEM — J96.21 ACUTE ON CHRONIC RESPIRATORY FAILURE WITH HYPOXIA: Status: RESOLVED | Noted: 2023-09-06 | Resolved: 2024-01-25

## 2024-01-25 PROBLEM — I50.33 ACUTE ON CHRONIC HEART FAILURE WITH PRESERVED EJECTION FRACTION (HFPEF): Status: RESOLVED | Noted: 2023-09-05 | Resolved: 2024-01-25

## 2024-01-25 PROBLEM — J96.11 CHRONIC RESPIRATORY FAILURE WITH HYPOXIA: Status: ACTIVE | Noted: 2024-01-25

## 2024-01-29 ENCOUNTER — TELEPHONE (OUTPATIENT)
Dept: CARDIOLOGY | Facility: CLINIC | Age: 59
End: 2024-01-29
Payer: COMMERCIAL

## 2024-01-29 NOTE — TELEPHONE ENCOUNTER
Spoke with patient, rescheduled March appointment for a sooner appointment with provider and placed on wait list.  Patient verbalized understanding.

## 2024-01-29 NOTE — TELEPHONE ENCOUNTER
----- Message from Darcie Mckeon sent at 1/29/2024  1:54 PM CST -----  Regarding: Schedule appt  Contact: Farhan  Patient Status: EP        Scheduling Appt: Hospital F/U        Time/Date Preference:Early February if possible        Contact Preference?: [phone] (home)              Additional Notes:  Pt seeking hospital follow up. Please advise. Requesting a call back.

## 2024-04-29 PROBLEM — J96.11 CHRONIC RESPIRATORY FAILURE WITH HYPOXIA: Status: RESOLVED | Noted: 2024-01-25 | Resolved: 2024-04-29

## 2024-04-29 PROBLEM — J15.5 PNEUMONIA DUE TO ESCHERICHIA COLI: Status: RESOLVED | Noted: 2024-01-23 | Resolved: 2024-04-29

## 2024-11-18 PROBLEM — J18.9 PNEUMONIA: Status: ACTIVE | Noted: 2024-11-18

## 2024-11-20 PROBLEM — I50.20 HFREF (HEART FAILURE WITH REDUCED EJECTION FRACTION): Status: ACTIVE | Noted: 2024-11-20

## 2024-11-20 PROBLEM — I50.9 ACUTE CHF: Status: RESOLVED | Noted: 2023-09-04 | Resolved: 2024-11-20

## 2024-11-20 PROBLEM — I50.31 ACUTE DIASTOLIC HEART FAILURE: Status: RESOLVED | Noted: 2023-12-18 | Resolved: 2024-11-20

## 2024-11-25 ENCOUNTER — OFFICE VISIT (OUTPATIENT)
Dept: NEPHROLOGY | Facility: CLINIC | Age: 59
End: 2024-11-25
Payer: COMMERCIAL

## 2024-11-25 VITALS
OXYGEN SATURATION: 95 % | DIASTOLIC BLOOD PRESSURE: 67 MMHG | BODY MASS INDEX: 49.91 KG/M2 | SYSTOLIC BLOOD PRESSURE: 121 MMHG | HEART RATE: 58 BPM | WEIGHT: 315 LBS

## 2024-11-25 DIAGNOSIS — I12.9 TYPE 2 DM WITH CKD STAGE 3 AND HYPERTENSION: ICD-10-CM

## 2024-11-25 DIAGNOSIS — N18.32 STAGE 3B CHRONIC KIDNEY DISEASE: Primary | ICD-10-CM

## 2024-11-25 DIAGNOSIS — I10 ESSENTIAL HYPERTENSION: Chronic | ICD-10-CM

## 2024-11-25 DIAGNOSIS — E66.01 MORBID OBESITY: ICD-10-CM

## 2024-11-25 DIAGNOSIS — E78.2 MIXED HYPERLIPIDEMIA: Chronic | ICD-10-CM

## 2024-11-25 DIAGNOSIS — I50.32 CHRONIC HEART FAILURE WITH PRESERVED EJECTION FRACTION: ICD-10-CM

## 2024-11-25 DIAGNOSIS — N18.30 TYPE 2 DM WITH CKD STAGE 3 AND HYPERTENSION: ICD-10-CM

## 2024-11-25 DIAGNOSIS — E11.22 TYPE 2 DM WITH CKD STAGE 3 AND HYPERTENSION: ICD-10-CM

## 2024-11-25 DIAGNOSIS — I50.20 HFREF (HEART FAILURE WITH REDUCED EJECTION FRACTION): ICD-10-CM

## 2024-11-25 PROCEDURE — 99204 OFFICE O/P NEW MOD 45 MIN: CPT | Mod: S$GLB,,, | Performed by: NURSE PRACTITIONER

## 2024-11-25 PROCEDURE — 99999 PR PBB SHADOW E&M-EST. PATIENT-LVL IV: CPT | Mod: PBBFAC,,, | Performed by: NURSE PRACTITIONER

## 2024-11-25 NOTE — PROGRESS NOTES
Subjective:       Patient ID: Farhan Crenshaw is a 59 y.o.  male who presents for new evaluation of CKD 3b.    HPI     Farhan Crenshaw is a 59 year old male with T2DM, HTN, HFrEF, mixed hyperlipidemia, PVD, morbid obesity, lower back pain that presents for new evaluation of CKD 3b post hospital discharge. He was recently admitted this month to [facility] for acute on chronic CHF. He was treated with IV lasix and discharged home on lasix 40mg qd and 3L home O2. He was not on home O2 prior to this admission. He denies prior nephrologist. His cardiologist is at [facility]. He reports intermittent NSAID use. He was diagnosed with T2DM about 10 years ago. Noted A1C in 2018 was 12.9. He was diagnosed with HTN before this time. The patient denies herbal supplements, or new antibiotics, recreational drugs, recent episode of dehydration, diarrhea, nausea or vomiting, acute illness, or exposure to IV radiocontrast.     Cr as of 2019 was 1.1-1.2. Repeat labs in 2023 with progression to 1.5-1.8. Continued slow progression with recent Cr 2.3-2.4 this month during admission.     Significant family hx includes: HTN     Last renal US: 12/18/23 , reviewed.  Impression:  Subcentimeter right renal cyst.  Possible nonobstructing subcentimeter left renal calculi.  Right pleural effusion.  Hepatomegaly with elongated right lobe of the liver.      Review of Systems   Respiratory:  Negative for shortness of breath.    Cardiovascular:  Positive for leg swelling (L> R). Negative for chest pain.   Gastrointestinal:  Negative for diarrhea, nausea and vomiting.   Genitourinary:  Negative for difficulty urinating, dysuria and hematuria.        A little bubbles    All other systems reviewed and are negative.      Objective:       Blood pressure 121/67, pulse (!) 58, weight (!) 166.9 kg (368 lb), SpO2 95%.  Physical Exam  Vitals reviewed.   Constitutional:       General: He is not in acute distress.     Appearance: Normal  "appearance. He is obese.   HENT:      Head: Normocephalic and atraumatic.   Eyes:      General: No scleral icterus.  Cardiovascular:      Rate and Rhythm: Normal rate and regular rhythm.   Pulmonary:      Effort: Pulmonary effort is normal. No respiratory distress.      Breath sounds: No wheezing or rales.   Musculoskeletal:      Right lower leg: Edema present.      Comments: Carlota KERR    Skin:     General: Skin is warm and dry.   Neurological:      Mental Status: He is alert and oriented to person, place, and time.   Psychiatric:         Mood and Affect: Mood normal.         Behavior: Behavior normal.           Current Outpatient Medications   Medication Sig Note    albuterol (VENTOLIN HFA) 90 mcg/actuation inhaler Inhale 2 puffs into the lungs every 6 (six) hours as needed for Wheezing or Shortness of Breath. Rescue     amLODIPine (NORVASC) 10 MG tablet Take 10 mg by mouth once daily. 12/17/2023: Med Classification: Cardiovascular Therapy Agents    aspirin (ECOTRIN) 81 MG EC tablet TAKE 1 TABLET(S) EVERY DAY BY ORAL ROUTE.     atorvastatin (LIPITOR) 80 MG tablet TAKE 1 TABLET BY MOUTH EVERY DAY     BASAGLAR KWIKPEN U-100 INSULIN glargine 100 units/mL (3mL) SubQ pen INJECT 20 UNITS INTO THE SKIN ONCE DAILY. (Patient taking differently: 20 Units 2 (two) times a day.)     BD ULTRA-FINE KAMAR PEN NEEDLE 32 gauge x 5/32" Ndle USE TO INJECT BASAGLAR ONCE A DAY     carvediloL (COREG) 25 MG tablet Take 25 mg by mouth 2 (two) times daily.     furosemide (LASIX) 40 MG tablet Take 1 tablet (40 mg total) by mouth once daily.     hydrALAZINE (APRESOLINE) 25 MG tablet Take 25 mg by mouth 2 (two) times a day.     isosorbide dinitrate (ISORDIL) 20 MG tablet Take 20 mg by mouth 2 (two) times daily.     lisinopriL (PRINIVIL,ZESTRIL) 40 MG tablet Take 40 mg by mouth once daily.    Last reviewed on 11/25/2024  8:09 AM by Kellen Yadav MA       Lab Results   Component Value Date    CREATININE 2.4 (H) 11/20/2024     eGFR   Date " "Value Ref Range Status   11/20/2024 30.5 (A) >60 mL/min/1.73 m^2 Final     No results found for: "UTPCR"  No results found for: "MICALBCREAT"  Lab Results   Component Value Date     11/20/2024    K 4.2 11/20/2024    CO2 25 11/20/2024     11/20/2024     Lab Results   Component Value Date    CALCIUM 8.5 (L) 11/20/2024     Lab Results   Component Value Date    HGB 10.2 (L) 11/20/2024    WBC 7.41 11/20/2024    HCT 35.3 (L) 11/20/2024      No results found for: "IRON", "FERRITIN"  Lab Results   Component Value Date    HGBA1C 7.5 (H) 11/18/2024     11/20/2024    BUN 38 (H) 11/20/2024     Lab Results   Component Value Date    LDLCALC 167.0 (H) 11/18/2024         Assessment:       1. Stage 3b chronic kidney disease    2. HFrEF (heart failure with reduced ejection fraction)    3. Essential hypertension    4. Chronic heart failure with preserved ejection fraction    5. Mixed hyperlipidemia    6. Morbid obesity    7. Type 2 DM with CKD stage 3 and hypertension        Plan:   CKD stage 3b c eGFR 30.5 mL/min -  - Cr ranging 2.3-2.4 on recent admission. Has ranged 1.7-2.1 earlier this year.   - CKD clinically related to longstanding uncontrolled T2DM, HTN and component of CRS (EF 35%, grade II dd).   - Counseled patient on the importance of low sodium diet, glycemic control, BP control, monitoring his weights (reports > 3lbs gain in a day or > 5lb gain in a week)  - Will refer to heart failure clinic for management  - repeat labs in 2 weeks. If stable will start SGLT2i. Continue RASi.     UPCR 2+ on UA. Check UPCR.    Acid-base WNL   Secondary hyperparathyroidism Check PTH and Vit D   Anemia Hgb stable in CKD. Monitor.    DM A1C improving on insulin. Goal < 7. Management per PCP.    Lipid Management Continue lipitor   ESRD planning Provided education about the stages of CKD, usual progression, and need to monitor for and treat CKD-related disease in an effort to delay progression of CKD.        HTN - Controlled " on current regimen    Combined systolic and diastolic heart failure - Refer to heart failure clinic. Continue lasix 40mg qd.     All questions patient had were answered.  Asked if further questions. None. F/u in clinic January 2025  with labs and urine prior to next visit or sooner if needed.  ER for emergency concerns.      Summary of Plan:  - Labs 2 weeks  - Plan for SGLT2i if renal function remains stable/improved  - RTC beginning of January for monitoring

## 2024-12-18 PROBLEM — I50.42 CHRONIC COMBINED SYSTOLIC AND DIASTOLIC CONGESTIVE HEART FAILURE: Status: ACTIVE | Noted: 2024-12-18

## 2024-12-18 PROBLEM — N17.9 AKI (ACUTE KIDNEY INJURY): Status: ACTIVE | Noted: 2024-12-18

## 2025-01-02 ENCOUNTER — TELEPHONE (OUTPATIENT)
Dept: NEPHROLOGY | Facility: CLINIC | Age: 60
End: 2025-01-02
Payer: COMMERCIAL

## 2025-01-02 DIAGNOSIS — N18.32 STAGE 3B CHRONIC KIDNEY DISEASE: Primary | ICD-10-CM

## 2025-01-07 ENCOUNTER — TELEPHONE (OUTPATIENT)
Dept: NEPHROLOGY | Facility: CLINIC | Age: 60
End: 2025-01-07

## 2025-01-07 ENCOUNTER — OFFICE VISIT (OUTPATIENT)
Dept: NEPHROLOGY | Facility: CLINIC | Age: 60
End: 2025-01-07
Payer: COMMERCIAL

## 2025-01-07 VITALS
BODY MASS INDEX: 50.18 KG/M2 | HEART RATE: 81 BPM | WEIGHT: 315 LBS | SYSTOLIC BLOOD PRESSURE: 159 MMHG | DIASTOLIC BLOOD PRESSURE: 74 MMHG

## 2025-01-07 DIAGNOSIS — I12.9 TYPE 2 DM WITH CKD STAGE 3 AND HYPERTENSION: ICD-10-CM

## 2025-01-07 DIAGNOSIS — I50.42 CHRONIC COMBINED SYSTOLIC AND DIASTOLIC CONGESTIVE HEART FAILURE: ICD-10-CM

## 2025-01-07 DIAGNOSIS — N18.32 STAGE 3B CHRONIC KIDNEY DISEASE: Primary | ICD-10-CM

## 2025-01-07 DIAGNOSIS — R80.9 PROTEINURIA, UNSPECIFIED TYPE: ICD-10-CM

## 2025-01-07 DIAGNOSIS — E66.01 MORBID OBESITY: ICD-10-CM

## 2025-01-07 DIAGNOSIS — N18.30 TYPE 2 DM WITH CKD STAGE 3 AND HYPERTENSION: ICD-10-CM

## 2025-01-07 DIAGNOSIS — N25.81 SECONDARY HYPERPARATHYROIDISM OF RENAL ORIGIN: ICD-10-CM

## 2025-01-07 DIAGNOSIS — I10 ESSENTIAL HYPERTENSION: ICD-10-CM

## 2025-01-07 DIAGNOSIS — E11.22 TYPE 2 DM WITH CKD STAGE 3 AND HYPERTENSION: ICD-10-CM

## 2025-01-07 PROCEDURE — 99215 OFFICE O/P EST HI 40 MIN: CPT | Mod: S$GLB,,, | Performed by: NURSE PRACTITIONER

## 2025-01-07 PROCEDURE — 99999 PR PBB SHADOW E&M-EST. PATIENT-LVL III: CPT | Mod: PBBFAC,,, | Performed by: NURSE PRACTITIONER

## 2025-01-07 RX ORDER — LISINOPRIL 40 MG/1
40 TABLET ORAL DAILY
Qty: 30 TABLET | Refills: 11 | Status: SHIPPED | OUTPATIENT
Start: 2025-01-07 | End: 2026-01-07

## 2025-01-07 RX ORDER — CARVEDILOL 25 MG/1
25 TABLET ORAL 2 TIMES DAILY
Qty: 60 TABLET | Refills: 11 | Status: SHIPPED | OUTPATIENT
Start: 2025-01-07 | End: 2026-01-07

## 2025-01-07 RX ORDER — FUROSEMIDE 40 MG/1
40 TABLET ORAL DAILY
Qty: 30 TABLET | Refills: 11 | Status: SHIPPED | OUTPATIENT
Start: 2025-01-07 | End: 2025-01-10

## 2025-01-07 RX ORDER — AMLODIPINE BESYLATE 10 MG/1
10 TABLET ORAL DAILY
Qty: 30 TABLET | Refills: 11 | Status: SHIPPED | OUTPATIENT
Start: 2025-01-07 | End: 2026-01-07

## 2025-01-07 NOTE — TELEPHONE ENCOUNTER
Called pt no answer l/m     ----- Message from [place] sent at 1/7/2025 11:16 AM CST -----  Regarding: Call requested  Contact: [phone]  Hi,     Who called: The pt       Reason:Pt would like to spk with the office to discuss getting the number to help with medication discount. Pls call the pt at  [phone]      Provider's name:Dr. Melendez      Additional Information: Thank you.

## 2025-01-07 NOTE — PROGRESS NOTES
Subjective:       Patient ID: Farhan Crenshaw is a 59 y.o.  male who presents for new evaluation of CKD 3b.    HPI     Farhan Crenshaw is a 59 year old male with T2DM, HTN, HFrEF, mixed hyperlipidemia, PVD, morbid obesity, lower back pain that presents for new evaluation of CKD 3b post hospital discharge. He was recently admitted this month to [facility] for acute on chronic CHF. He was treated with IV lasix and discharged home on lasix 40mg qd and 3L home O2. He was not on home O2 prior to this admission. He denies prior nephrologist. His cardiologist is at [facility]. He reports intermittent NSAID use. He was diagnosed with T2DM about 10 years ago. Noted A1C in 2018 was 12.9. He was diagnosed with HTN before this time. The patient denies herbal supplements, or new antibiotics, recreational drugs, recent episode of dehydration, diarrhea, nausea or vomiting, acute illness, or exposure to IV radiocontrast.     Cr as of 2019 was 1.1-1.2. Repeat labs in 2023 with progression to 1.5-1.8. Continued slow progression with recent Cr 2.3-2.4 this month during admission.     Significant family hx includes: HTN     Last renal US: 12/18/23 , reviewed.  Impression:  Subcentimeter right renal cyst.  Possible nonobstructing subcentimeter left renal calculi.  Right pleural effusion.  Hepatomegaly with elongated right lobe of the liver.    Update 1/7/25:  - S/p admission for acute on chronic CHF requiring IV diuretics  - Today he is on 4L O2, but has been on 3L at home  - Notes his legs feel more swollen and has baseline dyspnea on exertion. Reports he is urinating less.  - BP is elevated. Took medications right before appointment.     Review of Systems   Respiratory:  Positive for shortness of breath (with exertion).    Cardiovascular:  Positive for leg swelling (L> R). Negative for chest pain.   Gastrointestinal:  Negative for diarrhea, nausea and vomiting.   Genitourinary:  Positive for decreased urine volume.  "Negative for difficulty urinating, dysuria and hematuria.   All other systems reviewed and are negative.      Objective:       Blood pressure (!) 159/74, pulse 81, weight (!) 167.8 kg (370 lb).  Physical Exam  Vitals reviewed.   Constitutional:       General: He is not in acute distress.     Appearance: Normal appearance. He is obese.   HENT:      Head: Normocephalic and atraumatic.   Eyes:      General: No scleral icterus.  Cardiovascular:      Rate and Rhythm: Normal rate and regular rhythm.   Pulmonary:      Effort: Pulmonary effort is normal. No respiratory distress.      Breath sounds: No wheezing or rales.      Comments: diminished  Musculoskeletal:      Right lower leg: Edema present.      Comments: Brace LLE    Skin:     General: Skin is warm and dry.   Neurological:      Mental Status: He is alert and oriented to person, place, and time.   Psychiatric:         Mood and Affect: Mood normal.         Behavior: Behavior normal.           Current Outpatient Medications   Medication Sig Note    albuterol (VENTOLIN HFA) 90 mcg/actuation inhaler Inhale 2 puffs into the lungs every 6 (six) hours as needed for Wheezing or Shortness of Breath. Rescue     amLODIPine (NORVASC) 10 MG tablet Take 10 mg by mouth once daily. 12/17/2023: Med Classification: Cardiovascular Therapy Agents    aspirin (ECOTRIN) 81 MG EC tablet TAKE 1 TABLET(S) EVERY DAY BY ORAL ROUTE.     atorvastatin (LIPITOR) 80 MG tablet TAKE 1 TABLET BY MOUTH EVERY DAY     BASAGLAR KWIKPEN U-100 INSULIN glargine 100 units/mL (3mL) SubQ pen INJECT 20 UNITS INTO THE SKIN ONCE DAILY. (Patient taking differently: 20 Units 2 (two) times a day.)     BD ULTRA-FINE KAMAR PEN NEEDLE 32 gauge x 5/32" Ndle USE TO INJECT BASAGLAR ONCE A DAY     carvediloL (COREG) 25 MG tablet Take 25 mg by mouth 2 (two) times daily.     furosemide (LASIX) 40 MG tablet Take 1 tablet (40 mg total) by mouth once daily.     hydrALAZINE (APRESOLINE) 25 MG tablet Take 25 mg by mouth 2 (two) " "times a day.     isosorbide dinitrate (ISORDIL) 20 MG tablet Take 20 mg by mouth 2 (two) times daily.     lisinopriL (PRINIVIL,ZESTRIL) 40 MG tablet Take 40 mg by mouth once daily.    Last reviewed on 12/18/2024  6:31 AM by Caro Lagunas RN       Lab Results   Component Value Date    CREATININE 2.0 (H) 01/06/2025     eGFR   Date Value Ref Range Status   01/06/2025 38.0 (A) >60 mL/min/1.73 m^2 Final     Prot/Creat Ratio, Urine   Date Value Ref Range Status   01/06/2025 1.88 (H) 0.00 - 0.20 Final   12/09/2024 1.39 (H) 0.00 - 0.20 Final     No results found for: "MICALBCREAT"  Lab Results   Component Value Date     01/06/2025    K 4.1 01/06/2025    CO2 34 (H) 01/06/2025     01/06/2025     Lab Results   Component Value Date    .0 (H) 12/09/2024    CALCIUM 8.8 01/06/2025    PHOS 3.8 01/06/2025     Lab Results   Component Value Date    HGB 10.7 (L) 01/06/2025    WBC 6.32 01/06/2025    HCT 38.0 (L) 01/06/2025      No results found for: "IRON", "FERRITIN"  Lab Results   Component Value Date    HGBA1C 7.5 (H) 11/18/2024     01/06/2025    BUN 36 (H) 01/06/2025     Lab Results   Component Value Date    LDLCALC 167.0 (H) 11/18/2024     US 12/18/23:  FINDINGS:  Right kidney: The right kidney measures 12.7 cm in length.  No cortical thinning. No loss of corticomedullary distinction. Resistive index measures 0.75.  There is a 6 mm cyst within the mid to lower right kidney.  No solid renal masses are identified.  No renal stone. No hydronephrosis.     Left kidney: The left kidney measures 13.1 cm in length.  No cortical thinning. No loss of corticomedullary distinction. Resistive index measures 0.81.  No mass. There are a couple of subcentimeter echogenic foci within the upper pole of the left kidney measuring up to 5 mm in size which may represent nonobstructing calculi.  No hydronephrosis.     The bladder is partially distended at the time of scanning and has an unremarkable appearance.  Prevoid " bladder measurements suggest a prevoid bladder volume of 197 mL.     Incidental note is made of an elongated right lobe of the liver measuring up to 22.2 cm in length.  A right pleural effusion is also present.     Impression:     Subcentimeter right renal cyst.     Possible nonobstructing subcentimeter left renal calculi.     Right pleural effusion.     Hepatomegaly with elongated right lobe of the liver.    Assessment:       1. Stage 3b chronic kidney disease    2. Essential hypertension    3. Morbid obesity    4. Chronic combined systolic and diastolic congestive heart failure    5. Proteinuria, unspecified type    6. Secondary hyperparathyroidism of renal origin    7. Type 2 DM with CKD stage 3 and hypertension          Plan:   CKD stage 3b c eGFR 30.5--> 38 mL/min -  - Cr ranging 2.3-2.4 on recent admission. Has ranged 1.7-2.1 earlier this year.   - CKD clinically related to longstanding uncontrolled T2DM, HTN and component of CRS (EF 35%, grade II dd).   - Counseled patient on the importance of low sodium diet, glycemic control, BP control, monitoring his weights (reports > 3lbs gain in a day or > 5lb gain in a week), and weight loss in order to help slow the progression of CKD.  - Proteinuric. Continue with serologic w/u for proteinuria.   - Continue lisinopril 40mg. Add Jardiance 10mg. Repeat BMP 2 weeks.     Secondary hyperparathyroidism  - PTH elevated in CKD. Stable. Monitor PTH and Vit D. Ca and phos okay.     HTN - Elevated today. Normally better controlled. Continue current regimen. Refills sent for carvedilol, amlodipine, lisinopril, lasix.     Combined systolic and diastolic heart failure - Referred to heart failure clinic. Has not established care. Will reach out.  - Continue lasix 40mg qd for now. Start Jardiance 10mg qd.     Morbid Obesity - he is interested in medication for weight loss. I have encouraged him to establish care with a primary care provider for this. He is following with a  physician at [facility] but requests to be within the [facility] system. Phone number for scheduling given to patient.       All questions patient had were answered.  Asked if further questions. None. F/u in clinic 3 months with labs and urine prior to next visit or sooner if needed.  ER for emergency concerns.      Summary of Plan:  - Start Jardiance 10mg qd  - Repeat BMP 2 weeks  - Encouraged establishing care with PCP and cardiology (referral placed)  - RTC 2-3 months    I spent a total of 44 minutes on the day of the visit.  This includes face to face time and non-face to face time preparing to see the patient (eg, review of tests), obtaining and/or reviewing separately obtained history, documenting clinical information in the electronic or other health record, independently interpreting results and communicating results to the patient/family/caregiver, or care coordinator.

## 2025-01-10 ENCOUNTER — OFFICE VISIT (OUTPATIENT)
Dept: PRIMARY CARE CLINIC | Facility: CLINIC | Age: 60
End: 2025-01-10
Payer: COMMERCIAL

## 2025-01-10 ENCOUNTER — TELEPHONE (OUTPATIENT)
Dept: PHARMACY | Facility: CLINIC | Age: 60
End: 2025-01-10
Payer: COMMERCIAL

## 2025-01-10 VITALS
OXYGEN SATURATION: 91 % | SYSTOLIC BLOOD PRESSURE: 113 MMHG | DIASTOLIC BLOOD PRESSURE: 73 MMHG | HEIGHT: 72 IN | BODY MASS INDEX: 50.18 KG/M2 | RESPIRATION RATE: 18 BRPM | HEART RATE: 70 BPM

## 2025-01-10 DIAGNOSIS — Z12.11 COLON CANCER SCREENING: ICD-10-CM

## 2025-01-10 DIAGNOSIS — I49.9 CARDIAC ARRHYTHMIA, UNSPECIFIED CARDIAC ARRHYTHMIA TYPE: ICD-10-CM

## 2025-01-10 DIAGNOSIS — I50.20 HFREF (HEART FAILURE WITH REDUCED EJECTION FRACTION): ICD-10-CM

## 2025-01-10 DIAGNOSIS — J96.01 ACUTE RESPIRATORY FAILURE WITH HYPOXIA: ICD-10-CM

## 2025-01-10 DIAGNOSIS — Z76.89 ENCOUNTER TO ESTABLISH CARE: Primary | ICD-10-CM

## 2025-01-10 DIAGNOSIS — Z79.4 INSULIN DEPENDENT TYPE 2 DIABETES MELLITUS: Chronic | ICD-10-CM

## 2025-01-10 DIAGNOSIS — Z23 NEED FOR VACCINATION: ICD-10-CM

## 2025-01-10 DIAGNOSIS — E11.9 INSULIN DEPENDENT TYPE 2 DIABETES MELLITUS: Chronic | ICD-10-CM

## 2025-01-10 PROCEDURE — 93010 ELECTROCARDIOGRAM REPORT: CPT | Mod: S$GLB,,, | Performed by: INTERNAL MEDICINE

## 2025-01-10 PROCEDURE — 93005 ELECTROCARDIOGRAM TRACING: CPT | Mod: S$GLB,,, | Performed by: STUDENT IN AN ORGANIZED HEALTH CARE EDUCATION/TRAINING PROGRAM

## 2025-01-10 PROCEDURE — 99214 OFFICE O/P EST MOD 30 MIN: CPT | Mod: 25,S$GLB,, | Performed by: STUDENT IN AN ORGANIZED HEALTH CARE EDUCATION/TRAINING PROGRAM

## 2025-01-10 PROCEDURE — 90715 TDAP VACCINE 7 YRS/> IM: CPT | Mod: S$GLB,,, | Performed by: STUDENT IN AN ORGANIZED HEALTH CARE EDUCATION/TRAINING PROGRAM

## 2025-01-10 PROCEDURE — 90656 IIV3 VACC NO PRSV 0.5 ML IM: CPT | Mod: S$GLB,,, | Performed by: STUDENT IN AN ORGANIZED HEALTH CARE EDUCATION/TRAINING PROGRAM

## 2025-01-10 PROCEDURE — 90472 IMMUNIZATION ADMIN EACH ADD: CPT | Mod: S$GLB,,, | Performed by: STUDENT IN AN ORGANIZED HEALTH CARE EDUCATION/TRAINING PROGRAM

## 2025-01-10 PROCEDURE — 99999 PR PBB SHADOW E&M-EST. PATIENT-LVL V: CPT | Mod: PBBFAC,,, | Performed by: STUDENT IN AN ORGANIZED HEALTH CARE EDUCATION/TRAINING PROGRAM

## 2025-01-10 PROCEDURE — 90471 IMMUNIZATION ADMIN: CPT | Mod: S$GLB,,, | Performed by: STUDENT IN AN ORGANIZED HEALTH CARE EDUCATION/TRAINING PROGRAM

## 2025-01-10 RX ORDER — BUMETANIDE 1 MG/1
1 TABLET ORAL DAILY
Qty: 90 TABLET | Refills: 3 | Status: SHIPPED | OUTPATIENT
Start: 2025-01-10 | End: 2026-01-10

## 2025-01-10 RX ORDER — SEMAGLUTIDE 0.68 MG/ML
INJECTION, SOLUTION SUBCUTANEOUS
Qty: 3 ML | Refills: 0 | Status: SHIPPED | OUTPATIENT
Start: 2025-01-10 | End: 2025-02-21

## 2025-01-10 NOTE — PROGRESS NOTES
Subjective:       Patient ID: Farhan Crenshaw is a 59 y.o. male.    Chief Complaint: Establish Care    HPI:  59 y.o. male presents to [facility] to establish care    Acute concerns?: Patient with concerns for being on Oxygen and wants to know if this could be permanent.    Feels that breathing hasn't improved since hospitalization. If not concentrating, will drop into 70s. Currently on 3 lpm O2. Past 4 years have occurred around same months needing supplemental oxygen, but not this severe. Feels that albuterol doesn't help with symptoms.    Is currently on Lasix daily, swelling has been persistent. Furosemide was reduced with decrease in renal function.    No family history of thyroid cancer or MEN  No history of pancreatitis    Last PCP?: [place]  Allergies: NKDA  Medical History: CHF, T2DM, HTN, HLD, CKD 3b  Medications: ASA 81 mg, albuterol 90 mcg/actuation PRN, amlodipine 10 mg, atorvastatin 80 mg, hydralazine 25 mg bid, Insulin glargine 20 units bid, isosorbide mononitrate 20 mg bid, carvedilol 25 mg bid, empagliflozen 10 mg, lasix 40 mg, lisinopril 40 mg  Surgical History: adenoidecomy, tonsillectomy, appendectomy, left ankle ORIF  Family History: Mother lymph node cancer, father lung cancer; No known autoimmune disease, no dementia  Social History: Never smoker, EtOH on holidays, no illicits    Last blood work?: 1/6/2024  Last foot exam?: December Dr. Corrales  Eye exam?: 5 months ago [facility]  Colonoscopy?: Never  Last tetanus vaccine?: Amenable  Flu vaccine?: Amenable    Review of Systems   Constitutional:  Positive for fatigue (With shortness of breath). Negative for chills, diaphoresis and fever.   Respiratory:  Positive for cough (Improved from hospitalization) and shortness of breath.    Cardiovascular:  Negative for chest pain and palpitations.   Gastrointestinal:  Negative for abdominal pain, diarrhea, nausea and vomiting.   Musculoskeletal:  Negative for arthralgias, joint swelling  and myalgias.   Skin:  Negative for rash and wound.   Neurological:  Negative for dizziness and light-headedness.       Objective:      Vitals:    01/10/25 1403   BP: 113/73   BP Location: Right arm   Patient Position: Sitting   Pulse: 70   Resp: 18   SpO2: (!) 91%   Height: 6' (1.829 m)     Physical Exam  Vitals reviewed.   Constitutional:       General: He is not in acute distress.     Appearance: Normal appearance. He is not ill-appearing.   HENT:      Head: Normocephalic and atraumatic.      Mouth/Throat:      Mouth: Mucous membranes are moist.      Pharynx: Oropharynx is clear.   Eyes:      General:         Right eye: No discharge.         Left eye: No discharge.   Cardiovascular:      Rate and Rhythm: Normal rate. Rhythm irregular.      Pulses: Normal pulses.      Heart sounds: No murmur heard.  Pulmonary:      Effort: Pulmonary effort is normal.      Breath sounds: Normal breath sounds.   Abdominal:      Palpations: Abdomen is soft.      Tenderness: There is no abdominal tenderness.   Musculoskeletal:         General: No deformity.      Right lower leg: Edema present.      Left lower leg: Edema present.   Skin:     General: Skin is warm and dry.      Coloration: Skin is not jaundiced.   Neurological:      General: No focal deficit present.      Mental Status: He is alert and oriented to person, place, and time.   Psychiatric:         Mood and Affect: Mood normal.         Behavior: Behavior normal.             Lab Results   Component Value Date     01/06/2025    K 4.1 01/06/2025     01/06/2025    CO2 34 (H) 01/06/2025    BUN 36 (H) 01/06/2025    CREATININE 2.0 (H) 01/06/2025    ANIONGAP 9 01/06/2025     Lab Results   Component Value Date    HGBA1C 7.5 (H) 11/18/2024     Lab Results   Component Value Date     (H) 12/18/2024     (H) 11/17/2024     (H) 01/23/2024       Lab Results   Component Value Date    WBC 6.32 01/06/2025    HGB 10.7 (L) 01/06/2025    HCT 38.0 (L) 01/06/2025  "   HCT 32 (L) 12/18/2024     01/06/2025    GRAN 4.6 01/06/2025    GRAN 72.8 01/06/2025     Lab Results   Component Value Date    CHOL 235 (H) 11/18/2024    HDL 26 (L) 11/18/2024    LDLCALC 167.0 (H) 11/18/2024    TRIG 210 (H) 11/18/2024          Current Outpatient Medications:     albuterol (VENTOLIN HFA) 90 mcg/actuation inhaler, Inhale 2 puffs into the lungs every 6 (six) hours as needed for Wheezing or Shortness of Breath. Rescue, Disp: 1 g, Rfl: 2    amLODIPine (NORVASC) 10 MG tablet, Take 1 tablet (10 mg total) by mouth once daily., Disp: 30 tablet, Rfl: 11    aspirin (ECOTRIN) 81 MG EC tablet, TAKE 1 TABLET(S) EVERY DAY BY ORAL ROUTE., Disp: , Rfl: 11    atorvastatin (LIPITOR) 80 MG tablet, TAKE 1 TABLET BY MOUTH EVERY DAY, Disp: 15 tablet, Rfl: 0    BASAGLAR KWIKPEN U-100 INSULIN glargine 100 units/mL (3mL) SubQ pen, INJECT 20 UNITS INTO THE SKIN ONCE DAILY., Disp: 15 Syringe, Rfl: 0    BD ULTRA-FINE KAMAR PEN NEEDLE 32 gauge x 5/32" Ndle, USE TO INJECT BASAGLAR ONCE A DAY, Disp: 100 each, Rfl: 2    carvediloL (COREG) 25 MG tablet, Take 1 tablet (25 mg total) by mouth 2 (two) times daily., Disp: 60 tablet, Rfl: 11    empagliflozin (JARDIANCE) 10 mg tablet, Take 1 tablet (10 mg total) by mouth once daily., Disp: 30 tablet, Rfl: 11    hydrALAZINE (APRESOLINE) 25 MG tablet, Take 25 mg by mouth 2 (two) times a day., Disp: , Rfl:     isosorbide dinitrate (ISORDIL) 20 MG tablet, Take 20 mg by mouth 2 (two) times daily., Disp: , Rfl:     lisinopriL (PRINIVIL,ZESTRIL) 40 MG tablet, Take 1 tablet (40 mg total) by mouth once daily., Disp: 30 tablet, Rfl: 11    bumetanide (BUMEX) 1 MG tablet, Take 1 tablet (1 mg total) by mouth once daily., Disp: 90 tablet, Rfl: 3    semaglutide (OZEMPIC) 0.25 mg or 0.5 mg (2 mg/3 mL) pen injector, Inject 0.25 mg into the skin every 7 days for 28 days, THEN 0.5 mg every 7 days for 14 days., Disp: 3 mL, Rfl: 0    Current Facility-Administered Medications:     Tdap (BOOSTRIX) " vaccine injection 0.5 mL, 0.5 mL, Intramuscular, 1 time in Clinic/HOD,         Assessment:       1. Encounter to establish care    2. HFrEF (heart failure with reduced ejection fraction)    3. Insulin dependent type 2 diabetes mellitus    4. Colon cancer screening    5. Need for vaccination    6. Cardiac arrhythmia, unspecified cardiac arrhythmia type           Plan:       Encounter to establish care    HFrEF (heart failure with reduced ejection fraction)  -     Ambulatory referral/consult to Pharmacy Assistance; Future; Expected date: 01/17/2025  -     semaglutide (OZEMPIC) 0.25 mg or 0.5 mg (2 mg/3 mL) pen injector; Inject 0.25 mg into the skin every 7 days for 28 days, THEN 0.5 mg every 7 days for 14 days.  Dispense: 3 mL; Refill: 0  -     bumetanide (BUMEX) 1 MG tablet; Take 1 tablet (1 mg total) by mouth once daily.  Dispense: 90 tablet; Refill: 3  - Message sent to scheduling to assist with appointment  - Will change lasix to Bumex with poor response on lasix for fluid  - Pulse ox able to control at 90 with controlled breathing, drops into 70s at times. Instructed to increase home O2 to 4lpm and if any worsening symptoms of shortness of breath, present to ED    Insulin dependent type 2 diabetes mellitus  -     Ambulatory referral/consult to Pharmacy Assistance; Future; Expected date: 01/17/2025  -     semaglutide (OZEMPIC) 0.25 mg or 0.5 mg (2 mg/3 mL) pen injector; Inject 0.25 mg into the skin every 7 days for 28 days, THEN 0.5 mg every 7 days for 14 days.  Dispense: 3 mL; Refill: 0  - Continue lifestyle interventions. Will need to reduce insulin dose while titrating  - Will attempt to help with medication cost at this time    Colon cancer screening  -     Cologuard Screening (Multitarget Stool DNA); Future; Expected date: 01/10/2025    Need for vaccination  -     Influenza - Quadrivalent (PF)  -     Tdap (BOOSTRIX) vaccine injection 0.5 mL    Cardiac arrhythmia, unspecified cardiac arrhythmia type  -     EKG  12-lead    RTC in 1 month

## 2025-01-10 NOTE — TELEPHONE ENCOUNTER
We have reviewed Mr. Crenshaw current medication list and/or insurance status. Unfortunately, The Pharmacy Patient Assistance Team is unable to assist at this time due to the following reasons      Patient has Private/Commercial Insurance        The patient maybe eligible for Co-pay Assistance card please have the patient call for assistance.     Tootie Valverde  Pharmacy Patient Assistance Team

## 2025-01-13 LAB
OHS QRS DURATION: 98 MS
OHS QTC CALCULATION: 449 MS

## 2025-01-16 PROBLEM — J96.20 ACUTE ON CHRONIC RESPIRATORY FAILURE: Status: ACTIVE | Noted: 2023-09-06

## 2025-01-16 PROBLEM — R79.89 ELEVATED D-DIMER: Status: ACTIVE | Noted: 2025-01-16

## 2025-01-17 PROBLEM — J96.11 CHRONIC RESPIRATORY FAILURE WITH HYPOXIA AND HYPERCAPNIA: Status: ACTIVE | Noted: 2023-09-06

## 2025-01-17 PROBLEM — J96.12 CHRONIC RESPIRATORY FAILURE WITH HYPOXIA AND HYPERCAPNIA: Status: ACTIVE | Noted: 2023-09-06

## 2025-01-18 PROBLEM — R06.02 SOB (SHORTNESS OF BREATH): Status: ACTIVE | Noted: 2025-01-18

## 2025-01-18 PROBLEM — R05.1 ACUTE COUGH: Status: ACTIVE | Noted: 2025-01-18

## 2025-01-18 PROBLEM — J18.9 PNEUMONIA DUE TO INFECTIOUS ORGANISM: Status: ACTIVE | Noted: 2023-09-03

## 2025-01-19 PROBLEM — R05.9 COUGH: Status: ACTIVE | Noted: 2025-01-18

## 2025-01-20 PROBLEM — J18.9 PNEUMONIA DUE TO INFECTIOUS ORGANISM: Status: RESOLVED | Noted: 2023-09-03 | Resolved: 2025-01-20

## 2025-01-20 PROBLEM — R06.02 SOB (SHORTNESS OF BREATH): Status: RESOLVED | Noted: 2025-01-18 | Resolved: 2025-01-20

## 2025-01-28 ENCOUNTER — OFFICE VISIT (OUTPATIENT)
Dept: PRIMARY CARE CLINIC | Facility: CLINIC | Age: 60
End: 2025-01-28
Payer: COMMERCIAL

## 2025-01-28 VITALS
RESPIRATION RATE: 18 BRPM | HEIGHT: 72 IN | SYSTOLIC BLOOD PRESSURE: 134 MMHG | BODY MASS INDEX: 51.88 KG/M2 | DIASTOLIC BLOOD PRESSURE: 76 MMHG | HEART RATE: 61 BPM

## 2025-01-28 DIAGNOSIS — Z79.4 INSULIN DEPENDENT TYPE 2 DIABETES MELLITUS: ICD-10-CM

## 2025-01-28 DIAGNOSIS — J96.22 ACUTE ON CHRONIC RESPIRATORY FAILURE WITH HYPOXIA AND HYPERCAPNIA: ICD-10-CM

## 2025-01-28 DIAGNOSIS — E11.9 INSULIN DEPENDENT TYPE 2 DIABETES MELLITUS: ICD-10-CM

## 2025-01-28 DIAGNOSIS — J96.21 ACUTE ON CHRONIC RESPIRATORY FAILURE WITH HYPOXIA AND HYPERCAPNIA: ICD-10-CM

## 2025-01-28 DIAGNOSIS — Z09 HOSPITAL DISCHARGE FOLLOW-UP: Primary | ICD-10-CM

## 2025-01-28 PROCEDURE — 99214 OFFICE O/P EST MOD 30 MIN: CPT | Mod: S$GLB,,, | Performed by: STUDENT IN AN ORGANIZED HEALTH CARE EDUCATION/TRAINING PROGRAM

## 2025-01-28 PROCEDURE — 99999 PR PBB SHADOW E&M-EST. PATIENT-LVL IV: CPT | Mod: PBBFAC,,, | Performed by: STUDENT IN AN ORGANIZED HEALTH CARE EDUCATION/TRAINING PROGRAM

## 2025-01-28 RX ORDER — BEXAGLIFLOZIN 20 MG
20 TABLET ORAL DAILY
Qty: 90 TABLET | Refills: 3 | Status: SHIPPED | OUTPATIENT
Start: 2025-01-28

## 2025-01-28 NOTE — PROGRESS NOTES
Subjective:       Patient ID: Farhan Crenshaw is a 59 y.o. male.    Chief Complaint: Hospital Follow Up      HPI:  59 y.o. male presents to [facility] for hospital follow-up visit    Patient was seen in ED 1/15/2025 with Acute on chronic hypoxemic respiratory failure. Was treated with IV Abx for pneumonia with some improvement. 6 minute walk test prior to discharge indicated need for 5L NC. Patient was diuresed with bilateral pleural effusions and lower extremity edema.    Today, patient reports that even minimal exertion such as talking can make him out of breath.    O2 sats at home around 90s. Currently in room 75-80%.  If walking, oxygen can drop down into 60s. Talking will lower oxygen. Has raised home concentrator to 6 lpm.    Hx of CHF  Decreased level of endurance?: Yes  How many blocks before short of breath?: 0  Can lie down flat?: No  Lower extremity swelling?: Yes    Sleep Study scheduled?:   Will see Dr. Melendez with Nephrology 4/8/2025    Scheduled with Cardiology?: Not to date  Scheduled with Pulmonology?: Not to date    Objective:      Vitals:    01/28/25 0941   BP: 134/76   BP Location: Right arm   Patient Position: Sitting   Pulse: 61   Resp: 18   Height: 6' (1.829 m)     Physical Exam  Vitals reviewed.   Constitutional:       General: He is not in acute distress.     Appearance: Normal appearance. He is not ill-appearing.   HENT:      Head: Normocephalic and atraumatic.   Eyes:      General:         Right eye: No discharge.         Left eye: No discharge.      Conjunctiva/sclera: Conjunctivae normal.   Cardiovascular:      Rate and Rhythm: Normal rate and regular rhythm.      Pulses: Normal pulses.      Heart sounds: No murmur heard.  Pulmonary:      Effort: Pulmonary effort is normal.      Breath sounds: Normal breath sounds.   Musculoskeletal:         General: No deformity.      Cervical back: Neck supple. No rigidity.   Lymphadenopathy:      Cervical: No cervical adenopathy.   Skin:      General: Skin is warm and dry.      Coloration: Skin is not jaundiced.   Neurological:      General: No focal deficit present.      Mental Status: He is alert and oriented to person, place, and time.   Psychiatric:         Mood and Affect: Mood normal.         Behavior: Behavior normal.             Lab Results   Component Value Date     01/20/2025    K 4.2 01/20/2025     01/20/2025    CO2 30 (H) 01/20/2025    BUN 49 (H) 01/20/2025    CREATININE 2.5 (H) 01/20/2025    ANIONGAP 10 01/20/2025     Lab Results   Component Value Date    HGBA1C 7.5 (H) 11/18/2024     Lab Results   Component Value Date     (H) 01/15/2025     (H) 12/18/2024     (H) 11/17/2024       Lab Results   Component Value Date    WBC 7.75 01/20/2025    HGB 9.7 (L) 01/20/2025    HCT 34.7 (L) 01/20/2025    HCT 33 (L) 01/16/2025     01/20/2025    GRAN 5.7 01/20/2025    GRAN 73.2 (H) 01/20/2025     Lab Results   Component Value Date    CHOL 235 (H) 11/18/2024    HDL 26 (L) 11/18/2024    LDLCALC 167.0 (H) 11/18/2024    TRIG 210 (H) 11/18/2024          Current Outpatient Medications:     albuterol (VENTOLIN HFA) 90 mcg/actuation inhaler, Inhale 2 puffs into the lungs every 6 (six) hours as needed for Wheezing or Shortness of Breath. Rescue, Disp: 1 g, Rfl: 2    albuterol-ipratropium (DUO-NEB) 2.5 mg-0.5 mg/3 mL nebulizer solution, Take 3 mLs by nebulization every 6 (six) hours as needed for Wheezing or Shortness of Breath. Rescue, Disp: 75 mL, Rfl: 3    amLODIPine (NORVASC) 10 MG tablet, Take 1 tablet (10 mg total) by mouth once daily., Disp: 30 tablet, Rfl: 11    aspirin (ECOTRIN) 81 MG EC tablet, TAKE 1 TABLET(S) EVERY DAY BY ORAL ROUTE., Disp: , Rfl: 11    atorvastatin (LIPITOR) 80 MG tablet, TAKE 1 TABLET BY MOUTH EVERY DAY, Disp: 15 tablet, Rfl: 0    BASAGLAR KWIKPEN U-100 INSULIN glargine 100 units/mL (3mL) SubQ pen, INJECT 20 UNITS INTO THE SKIN ONCE DAILY., Disp: 15 Syringe, Rfl: 0    BD ULTRA-FINE KAMAR PEN  "NEEDLE 32 gauge x 5/32" Ndle, USE TO INJECT BASAGLAR ONCE A DAY, Disp: 100 each, Rfl: 2    bumetanide (BUMEX) 1 MG tablet, Take 1 tablet (1 mg total) by mouth once daily., Disp: 90 tablet, Rfl: 3    carvediloL (COREG) 25 MG tablet, Take 1 tablet (25 mg total) by mouth 2 (two) times daily., Disp: 60 tablet, Rfl: 11    hydrALAZINE (APRESOLINE) 25 MG tablet, Take 25 mg by mouth 2 (two) times a day., Disp: , Rfl:     isosorbide dinitrate (ISORDIL) 20 MG tablet, Take 20 mg by mouth 2 (two) times daily., Disp: , Rfl:     lisinopriL (PRINIVIL,ZESTRIL) 40 MG tablet, Take 1 tablet (40 mg total) by mouth once daily., Disp: 30 tablet, Rfl: 11    semaglutide (OZEMPIC) 0.25 mg or 0.5 mg (2 mg/3 mL) pen injector, Inject 0.25 mg into the skin every 7 days for 28 days, THEN 0.5 mg every 7 days for 14 days., Disp: 3 mL, Rfl: 0    bexagliflozin (BRENZAVVY) 20 mg Tab, Take 20 mg by mouth once daily., Disp: 90 tablet, Rfl: 3        Assessment:       1. Hospital discharge follow-up    2. Acute on chronic respiratory failure with hypoxia and hypercapnia    3. Insulin dependent type 2 diabetes mellitus           Plan:       Hospital discharge follow-up  Acute on chronic respiratory failure with hypoxia and hypercapnia  - With desaturations on pulse ox in clinic into 70s while speaking and 60s at home, recommend patient to present to ED at this time for management  - Possible benefit with recurrent symptoms of device such as home Trilogy Ventilator    Insulin dependent type 2 diabetes mellitus  -     bexagliflozin (BRENZAVVY) 20 mg Tab; Take 20 mg by mouth once daily.  Dispense: 90 tablet; Refill: 3    Patient escorted to emergency department today's visit    "

## 2025-02-06 ENCOUNTER — OFFICE VISIT (OUTPATIENT)
Dept: PULMONOLOGY | Facility: CLINIC | Age: 60
End: 2025-02-06
Payer: COMMERCIAL

## 2025-02-06 VITALS
WEIGHT: 315 LBS | DIASTOLIC BLOOD PRESSURE: 77 MMHG | HEART RATE: 102 BPM | HEIGHT: 72 IN | OXYGEN SATURATION: 82 % | SYSTOLIC BLOOD PRESSURE: 134 MMHG | BODY MASS INDEX: 42.66 KG/M2

## 2025-02-06 DIAGNOSIS — J96.12 CHRONIC RESPIRATORY FAILURE WITH HYPOXIA AND HYPERCAPNIA: Primary | ICD-10-CM

## 2025-02-06 DIAGNOSIS — E66.2 OBESITY HYPOVENTILATION SYNDROME: ICD-10-CM

## 2025-02-06 DIAGNOSIS — I27.20 PULMONARY HYPERTENSION: ICD-10-CM

## 2025-02-06 DIAGNOSIS — J96.11 CHRONIC RESPIRATORY FAILURE WITH HYPOXIA AND HYPERCAPNIA: Primary | ICD-10-CM

## 2025-02-06 DIAGNOSIS — I50.42 CHRONIC COMBINED SYSTOLIC AND DIASTOLIC CONGESTIVE HEART FAILURE: ICD-10-CM

## 2025-02-06 PROCEDURE — 99215 OFFICE O/P EST HI 40 MIN: CPT | Mod: S$GLB,,, | Performed by: INTERNAL MEDICINE

## 2025-02-06 PROCEDURE — 99999 PR PBB SHADOW E&M-EST. PATIENT-LVL V: CPT | Mod: PBBFAC,,, | Performed by: INTERNAL MEDICINE

## 2025-02-06 NOTE — PROGRESS NOTES
Follow up  [facility]    SUBJECTIVE:     Chief Complaint:   Shortness of breath    History of Present Illness:  Farhan Casillas is a 59 y.o. male who presents for follow up of shortness of breath. The patient reports chronic dyspnea which has been worse over the past two months. +orthopnea. +edema bilateral legs. No cough or fevers. Pt uses home oxygen. The patient has had five hospitalizations for respiratory compromise in the past year. He just returned home from the hospital a couple days ago. He has his home bipap machine now & is adhering to it. He feels like it is helping him a lot.    Pt reports no personal history of lung disease.    Pt is a lifetime nonsmoker. No childhood asthma. Has a cat & a dog. No birds.     Pt is no longer working.    PMH: chronic combined heart failure, lymphedema    Review of patient's allergies indicates:   Allergen Reactions    Levofloxacin Itching       Past Medical History:   Diagnosis Date    Acute respiratory failure 12/17/2023    Arthritis     Bacterial pneumonia 09/2023    CHF (congestive heart failure)     Diabetes mellitus, type 2     Hyperlipidemia     Hypertension      Past Surgical History:   Procedure Laterality Date    ADENOIDECTOMY  1975    APPENDECTOMY  1995    TONSILLECTOMY  1975     Family History   Problem Relation Name Age of Onset    Arthritis Mother Patricia casillas     Cancer Mother Patricia casillas         lymph node     Hypertension Mother Patricia casillas     Cancer Father Abel Casillas         lung     Social History     Socioeconomic History    Marital status:     Number of children: 2   Occupational History    Occupation: Retail Sales    Tobacco Use    Smoking status: Never    Smokeless tobacco: Never   Substance and Sexual Activity    Alcohol use: No    Drug use: No     Social Drivers of Health     Financial Resource Strain: Medium Risk (1/28/2025)    Overall Financial Resource Strain (CARDIA)     Difficulty of Paying Living Expenses: Somewhat hard    Food Insecurity: No Food Insecurity (1/28/2025)    Hunger Vital Sign     Worried About Running Out of Food in the Last Year: Never true     Ran Out of Food in the Last Year: Never true   Transportation Needs: No Transportation Needs (1/28/2025)    TRANSPORTATION NEEDS     Transportation : No   Physical Activity: Inactive (1/16/2025)    Exercise Vital Sign     Days of Exercise per Week: 0 days     Minutes of Exercise per Session: 0 min   Stress: Stress Concern Present (1/28/2025)    [employer] [place] [employer] - Occupational Stress Questionnaire     Feeling of Stress : To some extent   Housing Stability: Low Risk  (1/28/2025)    Housing Stability Vital Sign     Unable to Pay for Housing in the Last Year: No     Homeless in the Last Year: No     Review of Systems:  Cardiac: leg swelling & orthopnea  Musculoskeletal: +lymphedema  Resp: +dyspnea  Hem: no bleeding  ENT: no stridor  Rheum: no autoimmune disease  Psych: +anxiety when he feels short of breath  Endocrine: Pt has a goal for weight loss. Considering ozempic as a tx.    OBJECTIVE:     Vital Signs  Vitals:    02/06/25 0832 02/06/25 0958   BP: 134/77    BP Location: Right arm    Patient Position: Sitting    Pulse: 102    SpO2: 98%  Comment: wearing 6 LPM oxygen (!) 82%  Comment: wearing 3 LPM oxygen   Weight: (!) 166.9 kg (368 lb)    Height: 6' (1.829 m)      Body mass index is 49.91 kg/m².    Physical Exam:  General: no distress  Eyes:  conjunctivae/corneas clear  Nose: no discharge  Neck: trachea midline with no masses appreciated  Lungs:  normal respiratory effort, no wheezes, no rales, breath sounds are diminished bilaterally at bases  Heart: regular rate and rhythm and no murmur  Abdomen: non-distended  Extremities: no cyanosis, +lymphedema bilateral legs, no clubbing  Skin: No rashes or lesions. good skin turgor  Neurologic: alert, oriented, thought content appropriate    Laboratory:  Lab Results   Component Value Date    WBC 6.08 02/03/2025     HGB 10.1 (L) 02/03/2025    HCT 36.3 (L) 02/03/2025    MCV 83 02/03/2025     02/03/2025     Chest Imaging, My Impression:   CT Chest 1/2025: bilateral pleural effusion R > L. Bilateral basilar atelectasis R>L. Numerous small calcifications within the lung parenchyma.    Diagnostic Results:  TTE 11/2024:  Left Ventricle The left ventricle is mildly dilated. Severely increased ventricular mass. Increased wall thickness. There is moderate concentric hypertrophy. Moderate global hypokinesis present. There is moderately reduced systolic function. Ejection fraction is approximately 35%. Grade II diastolic dysfunction.   Right Ventricle Normal right ventricular cavity size. Right ventricle wall motion  is normal. Systolic function is normal.   Left Atrium Left atrium is moderately dilated.   Right Atrium Normal right atrial size.   Aortic Valve The aortic valve is a trileaflet valve. There is moderate aortic valve sclerosis. Mildly restricted motion. Aortic valve peak velocity is 1.2 m/s. Mean gradient is 3.3 mmHg.   Mitral Valve The mitral valve is structurally normal. There is normal leaflet mobility. The mean pressure gradient across the mitral valve is 2 mmHg at a heart rate of  bpm. There is trace regurgitation.   Tricuspid Valve The tricuspid valve is structurally normal. There is normal leaflet mobility. There is trace regurgitation.   Pulmonic Valve The pulmonic valve is structurally normal. There is normal leaflet mobility.   IVC/SVC Normal venous pressure at 3 mmHg.   Ascending Aorta Aortic root is normal in size. Ascending aorta is normal measuring 2.71 cm.   Pericardium and Other Findings There is no pericardial effusion.   Pulmonary Artery Pulmonary artery pressure could not be accurately determined.     PFT Today: no obstruction, +very severe restriction, DLCO is diminished.    ASSESSMENT/PLAN:     Chronic hypoxemic & hypercapnic respiratory failure  - Obtain echo to estimate PASP & evaluate for  shunt.  - Continue supplemental oxygen.  - Continue bipap use.    Obesity hypoventilation syndrome with BMI 50  - Counseled patient on weight loss & lung function.   - Pt is symptomatic from hypercapnia with history notable for excessive fatigue, headache, intermittent cognitive dysfunction related to hypercapnia, & dyspnea. The patient has had five hospitalizations for respiratory compromise in the past year. The patient was able to get a home bipap machine though he is not sure which [email or web address] company provided it. He is finding it to be very helpful. In fact, he wore it for eleven hours last night. Continue bipap.       13 d ago  (1/16/25) 2 wk ago  (1/15/25) 2 wk ago  (1/15/25) 2 wk ago  (1/15/25) 1 mo ago  (12/18/24) 1 mo ago  (12/18/24) 1 mo ago  (12/18/24)     POC PH 7.35 - 7.45 7.331 Low  7.333 Low   7.290 Low Panic  7.324 Low  7.318 Low     POC PCO2 35 - 45 mmHg 70.6 High Panic  70.5 High Panic   79.6 High Panic  65.9 High Panic  65.4 High Panic     POC PO2 80 - 100 mmHg 60 Low  55 Low Panic   54 Low Panic  103 High  86    POC HCO3 24 - 28 mmol/L 37.3 High  37.4 High   38.3 High  34.2 High  33.6 High     POC BE -2 to 2 mmol/L 11 High  12 High   12 High  8 High  7 High     POC SATURATED O2 95 - 100 % 87 84  82 97 95      Chronic combined heart failure with bilateral pleural effusions.  - Recommend establishing care with cardiology for management of GDMT & diuretic (saw Dr Sanchez over a year ago & Dr Sanchez has left). He is taking bumex as prescribed.    Jean Claude Harmon MD  [facility]

## 2025-02-10 ENCOUNTER — TELEPHONE (OUTPATIENT)
Dept: PULMONOLOGY | Facility: CLINIC | Age: 60
End: 2025-02-10
Payer: COMMERCIAL

## 2025-02-10 NOTE — TELEPHONE ENCOUNTER
----- Message from Lylette sent at 2/10/2025  2:14 PM CST -----  Regarding: Pt Advice  Contact: [phone]  Radha/daughter calling regarding bringing pt to ER for breathing. He has been on the Bipap machine all night and day and is still SOB. Please call [phone]

## 2025-02-11 ENCOUNTER — TELEPHONE (OUTPATIENT)
Dept: PRIMARY CARE CLINIC | Facility: CLINIC | Age: 60
End: 2025-02-11
Payer: COMMERCIAL

## 2025-02-11 DIAGNOSIS — J96.21 ACUTE ON CHRONIC RESPIRATORY FAILURE WITH HYPOXIA AND HYPERCAPNIA: Primary | ICD-10-CM

## 2025-02-11 DIAGNOSIS — J96.22 ACUTE ON CHRONIC RESPIRATORY FAILURE WITH HYPOXIA AND HYPERCAPNIA: Primary | ICD-10-CM

## 2025-02-11 NOTE — TELEPHONE ENCOUNTER
----- Message from Cassandra sent at 2/7/2025 12:40 PM CST -----  Contact: Tessy/Daughter [phone]  Requesting an RX refill or new RX.  Is this a refill or new RX: New   RX name and strength (copy/paste from chart):  Jardiance  Is this a 30 day or 90 day RX:   Pharmacy name and phone # (copy/paste from chart):    [place] [address] [address]  [facility] [address]  [facility] [address]  Phone: [phone] Fax: [phone]        Requesting an RX refill or new RX.  Is this a refill or new RX: New   RX name and strength (copy/paste from chart):  Nebulizer/Machine  Is this a 30 day or 90 day RX:   Pharmacy name and phone # (copy/paste from chart):    [place] [address] [place] [address] [address]  [facility] [address]  Phone: [phone] Fax: [phone]      # # # # Patient states the following prescriptions have never been sent in to the pharmacy.    Please call and advise.    Thank You

## 2025-02-13 ENCOUNTER — PATIENT MESSAGE (OUTPATIENT)
Dept: PRIMARY CARE CLINIC | Facility: CLINIC | Age: 60
End: 2025-02-13
Payer: COMMERCIAL

## 2025-02-27 ENCOUNTER — OFFICE VISIT (OUTPATIENT)
Dept: PRIMARY CARE CLINIC | Facility: CLINIC | Age: 60
End: 2025-02-27

## 2025-02-27 ENCOUNTER — TELEPHONE (OUTPATIENT)
Dept: PULMONOLOGY | Facility: CLINIC | Age: 60
End: 2025-02-27

## 2025-02-27 VITALS
OXYGEN SATURATION: 96 % | BODY MASS INDEX: 49.91 KG/M2 | DIASTOLIC BLOOD PRESSURE: 68 MMHG | HEIGHT: 72 IN | HEART RATE: 60 BPM | SYSTOLIC BLOOD PRESSURE: 110 MMHG

## 2025-02-27 DIAGNOSIS — J96.21 ACUTE ON CHRONIC RESPIRATORY FAILURE WITH HYPOXIA AND HYPERCAPNIA: ICD-10-CM

## 2025-02-27 DIAGNOSIS — J96.22 ACUTE ON CHRONIC RESPIRATORY FAILURE WITH HYPOXIA AND HYPERCAPNIA: ICD-10-CM

## 2025-02-27 DIAGNOSIS — E11.9 INSULIN DEPENDENT TYPE 2 DIABETES MELLITUS: ICD-10-CM

## 2025-02-27 DIAGNOSIS — Z09 HOSPITAL DISCHARGE FOLLOW-UP: Primary | ICD-10-CM

## 2025-02-27 DIAGNOSIS — Z79.4 INSULIN DEPENDENT TYPE 2 DIABETES MELLITUS: ICD-10-CM

## 2025-02-27 PROCEDURE — 99999 PR PBB SHADOW E&M-EST. PATIENT-LVL IV: CPT | Mod: PBBFAC,,, | Performed by: STUDENT IN AN ORGANIZED HEALTH CARE EDUCATION/TRAINING PROGRAM

## 2025-02-27 PROCEDURE — 99214 OFFICE O/P EST MOD 30 MIN: CPT | Mod: PBBFAC,PN | Performed by: STUDENT IN AN ORGANIZED HEALTH CARE EDUCATION/TRAINING PROGRAM

## 2025-02-27 NOTE — TELEPHONE ENCOUNTER
----- Message from Natalie sent at 2/27/2025 10:30 AM CST -----  Please call patient and schedule a follow up appointment

## 2025-02-27 NOTE — PROGRESS NOTES
Subjective:       Patient ID: Farhan Crenshaw is a 59 y.o. male.    Chief Complaint: Follow-up      HPI:  59 y.o. male presents to [facility] for hospital follow-up visit    Acute concerns?: None today, has been feeling much better at this time. 2-3 L O2 needed at this time and feels that breathing is much improved. Feels that he wouldn't pass a 6 minute walk test.    Was hospitalized 2/11/2025-2/18/2025 with hypercapneic respiratory failure. Was treated for CHF exacerbation and pneumonia while inpatient.     Has Cardiology follow-up scheduled?: 3/12/2025 saw Dr. Gerson Zuñiga  Will see ENT 3/28/2025.    Will arrange follow-up with Pulmonology.    Is getting on Jardiance and awaiting approval.    Blood glucose day before yesterday 147    Review of Systems   Constitutional:  Negative for chills, diaphoresis, fatigue and fever.   HENT:  Negative for congestion, sinus pressure, sneezing and sore throat.    Respiratory:  Negative for cough and shortness of breath.    Cardiovascular:  Negative for chest pain and palpitations.   Gastrointestinal:  Positive for diarrhea (Feels medication related). Negative for abdominal pain, nausea and vomiting.   Musculoskeletal:  Negative for joint swelling and myalgias.   Skin:  Negative for rash and wound.       Objective:      Vitals:    02/27/25 0936   BP: 110/68   BP Location: Right forearm   Patient Position: Sitting   Pulse: 60   SpO2: 96%   Height: 6' (1.829 m)     Physical Exam  Vitals reviewed.   Constitutional:       General: He is not in acute distress.     Appearance: Normal appearance. He is not ill-appearing.   HENT:      Head: Normocephalic and atraumatic.   Eyes:      General:         Right eye: No discharge.         Left eye: No discharge.      Conjunctiva/sclera: Conjunctivae normal.   Cardiovascular:      Rate and Rhythm: Normal rate and regular rhythm.      Pulses: Normal pulses.      Heart sounds: No murmur heard.  Pulmonary:      Effort: Pulmonary effort is  normal.      Breath sounds: Normal breath sounds.   Musculoskeletal:         General: No deformity.      Cervical back: Neck supple. No rigidity.   Lymphadenopathy:      Cervical: No cervical adenopathy.   Skin:     General: Skin is warm and dry.      Coloration: Skin is not jaundiced.   Neurological:      General: No focal deficit present.      Mental Status: He is alert and oriented to person, place, and time.   Psychiatric:         Mood and Affect: Mood normal.         Behavior: Behavior normal.             Lab Results   Component Value Date     02/18/2025    K 4.3 02/18/2025    CL 98 02/18/2025    CO2 28 02/18/2025    BUN 75 (H) 02/18/2025    CREATININE 2.2 (H) 02/18/2025    ANIONGAP 10 02/18/2025     Lab Results   Component Value Date    HGBA1C 7.5 (H) 11/18/2024     Lab Results   Component Value Date    BNP 88 02/11/2025     (H) 01/28/2025     (H) 01/15/2025       Lab Results   Component Value Date    WBC 7.49 02/18/2025    HGB 11.1 (L) 02/18/2025    HCT 37.1 (L) 02/18/2025    HCT 39 02/11/2025     02/18/2025    GRAN 6.6 02/18/2025    GRAN 88.0 (H) 02/18/2025     Lab Results   Component Value Date    CHOL 235 (H) 11/18/2024    HDL 26 (L) 11/18/2024    LDLCALC 167.0 (H) 11/18/2024    TRIG 210 (H) 11/18/2024        Current Medications[1]        Assessment:       1. Hospital discharge follow-up    2. Acute on chronic respiratory failure with hypoxia and hypercapnia    3. Insulin dependent type 2 diabetes mellitus           Plan:       Hospital discharge follow-up  Acute on chronic respiratory failure with hypoxia and hypercapnia  Insulin dependent type 2 diabetes mellitus  -     empagliflozin (JARDIANCE) 10 mg tablet; Take 1 tablet (10 mg total) by mouth once daily.  Dispense: 30 tablet; Refill: 0  - Much improved from prior examination  - Informed patient that this clinic does not provide long-term disability examinations and recommend reaching out to   - Consider 6  "minute walk test in future if breathing continues to improve    RTC in 3 months                              [1]   Current Outpatient Medications:     albuterol (VENTOLIN HFA) 90 mcg/actuation inhaler, Inhale 2 puffs into the lungs every 6 (six) hours as needed for Wheezing or Shortness of Breath. Rescue, Disp: 1 g, Rfl: 2    albuterol-ipratropium (DUO-NEB) 2.5 mg-0.5 mg/3 mL nebulizer solution, Take 3 mLs by nebulization every 6 (six) hours as needed for Wheezing or Shortness of Breath. Rescue, Disp: 75 mL, Rfl: 3    amLODIPine (NORVASC) 10 MG tablet, Take 1 tablet (10 mg total) by mouth once daily., Disp: 30 tablet, Rfl: 11    aspirin (ECOTRIN) 81 MG EC tablet, TAKE 1 TABLET(S) EVERY DAY BY ORAL ROUTE., Disp: , Rfl: 11    atorvastatin (LIPITOR) 80 MG tablet, TAKE 1 TABLET BY MOUTH EVERY DAY, Disp: 15 tablet, Rfl: 0    BASAGLAR KWIKPEN U-100 INSULIN glargine 100 units/mL (3mL) SubQ pen, INJECT 20 UNITS INTO THE SKIN ONCE DAILY. (Patient taking differently: 2 (two) times a day.), Disp: 15 Syringe, Rfl: 0    BD ULTRA-FINE KAMAR PEN NEEDLE 32 gauge x 5/32" Ndle, USE TO INJECT BASAGLAR ONCE A DAY, Disp: 100 each, Rfl: 2    carvediloL (COREG) 25 MG tablet, Take 1 tablet (25 mg total) by mouth 2 (two) times daily., Disp: 60 tablet, Rfl: 11    fluticasone propionate (FLONASE) 50 mcg/actuation nasal spray, 2 sprays (100 mcg total) by Each Nostril route 2 (two) times a day., Disp: 9.9 mL, Rfl: 0    furosemide (LASIX) 80 MG tablet, Take 1 tablet twice a day for 3 days then once daily.  Increased to twice a day with increased leg swelling or shortness of breath, Disp: 180 tablet, Rfl: 3    hydrALAZINE (APRESOLINE) 25 MG tablet, Take 25 mg by mouth 2 (two) times a day., Disp: , Rfl:     isosorbide dinitrate (ISORDIL) 20 MG tablet, Take 20 mg by mouth 2 (two) times daily., Disp: , Rfl:     lisinopriL (PRINIVIL,ZESTRIL) 40 MG tablet, Take 1 tablet (40 mg total) by mouth once daily., Disp: 30 tablet, Rfl: 11    empagliflozin " (JARDIANCE) 10 mg tablet, Take 1 tablet (10 mg total) by mouth once daily., Disp: 30 tablet, Rfl: 0

## 2025-04-08 DIAGNOSIS — N18.32 STAGE 3B CHRONIC KIDNEY DISEASE: Primary | ICD-10-CM

## 2025-05-07 ENCOUNTER — E-VISIT (OUTPATIENT)
Dept: PRIMARY CARE CLINIC | Facility: CLINIC | Age: 60
End: 2025-05-07
Payer: COMMERCIAL

## 2025-05-07 DIAGNOSIS — J40 BRONCHITIS: ICD-10-CM

## 2025-05-07 DIAGNOSIS — J96.22 ACUTE ON CHRONIC RESPIRATORY FAILURE WITH HYPOXIA AND HYPERCAPNIA: Primary | ICD-10-CM

## 2025-05-07 DIAGNOSIS — J96.21 ACUTE ON CHRONIC RESPIRATORY FAILURE WITH HYPOXIA AND HYPERCAPNIA: Primary | ICD-10-CM

## 2025-05-08 ENCOUNTER — TELEPHONE (OUTPATIENT)
Dept: NEPHROLOGY | Facility: CLINIC | Age: 60
End: 2025-05-08
Payer: COMMERCIAL

## 2025-05-08 RX ORDER — BENZONATATE 200 MG/1
200 CAPSULE ORAL 3 TIMES DAILY PRN
Qty: 30 CAPSULE | Refills: 0 | Status: SHIPPED | OUTPATIENT
Start: 2025-05-08 | End: 2025-05-18

## 2025-05-08 RX ORDER — PREDNISONE 20 MG/1
40 TABLET ORAL DAILY
Qty: 10 TABLET | Refills: 0 | Status: SHIPPED | OUTPATIENT
Start: 2025-05-08 | End: 2025-05-13

## 2025-05-08 RX ORDER — CEFDINIR 300 MG/1
300 CAPSULE ORAL 2 TIMES DAILY
Qty: 10 CAPSULE | Refills: 0 | Status: SHIPPED | OUTPATIENT
Start: 2025-05-08 | End: 2025-05-13

## 2025-05-08 RX ORDER — GUAIFENESIN 600 MG/1
1200 TABLET, EXTENDED RELEASE ORAL 2 TIMES DAILY
Qty: 40 TABLET | Refills: 0 | Status: SHIPPED | OUTPATIENT
Start: 2025-05-08 | End: 2025-05-18

## 2025-05-08 NOTE — PROGRESS NOTES
Patient ID: Farhan Crenshaw is a 60 y.o. male.    Chief Complaint: General Illness (Entered automatically based on patient selection in [employer].)    The patient initiated a request through [employer] on 5/7/2025 for evaluation and management with a chief complaint of General Illness (Entered automatically based on patient selection in [employer].)     I evaluated the questionnaire submission on 5/8/2025.    [facility] Evisit Supergroup-Cough And Cold    5/7/2025  6:30 PM CDT - Filed by Patient   What do you need help with? Cough, Cold, Sore Throat   Do you agree to participate in an E-Visit? Yes   If you have any of the following symptoms, go to your local emergency room or call 911: I acknowledge   What is the main issue you would like addressed today? Lung congestion,would like meds prescribed in hospital on last visit. Prednisone and cifnidir. Coughing up yellowish green mucus taking musinex twice a day plus daily meds.   Do you think you might have COVID-19 or the Flu? No   What symptoms do you currently have?  Cough   Describe your cough: Contains mucus;  Dry   Describe your mucus: Green;  Yellow   Have you ever smoked? Never smoked   Do you have a fever? No   When did your concern begin? 5/5/2025   In the last two weeks, have you been in close contact with someone who has COVID-19, the Flu, or strep throat? No   What have you tried to help your symptoms? Cold medication;  Rest and sleep   On a scale of 1-10, where 10 is the worst you can imagine, how severe are your symptoms? (range: 1 - 10) 3   How have your symptoms changed since they first started? No change   Do you have transportation to an [facility] location to get tested for COVID-19? Yes   Provide any additional information you feel is important. I feel the same as the last few times I went to the hospital. Its not as far along as before but not getting better.I feel fine minus cough and mucus discharge. I think antibiotics can put me over the hump.   Please  attach any relevant images or files    Are you able to take your vital signs? Yes   Systolic Blood Pressure: 135   Diastolic Blood Pressure: 86   Weight: 350   Height: 72   Pulse: 80   Temperature:    Respiration rate: 91   Pulse Oxygen:          Encounter Diagnoses   Name Primary?    Acute on chronic respiratory failure with hypoxia and hypercapnia Yes    Bronchitis         No orders of the defined types were placed in this encounter.     Medications Ordered This Encounter   Medications    benzonatate (TESSALON) 200 MG capsule     Sig: Take 1 capsule (200 mg total) by mouth 3 (three) times daily as needed for Cough.     Dispense:  30 capsule     Refill:  0    cefdinir (OMNICEF) 300 MG capsule     Sig: Take 1 capsule (300 mg total) by mouth 2 (two) times daily. for 5 days     Dispense:  10 capsule     Refill:  0    guaiFENesin (MUCINEX) 600 mg 12 hr tablet     Sig: Take 2 tablets (1,200 mg total) by mouth 2 (two) times daily. for 10 days     Dispense:  40 tablet     Refill:  0    predniSONE (DELTASONE) 20 MG tablet     Sig: Take 2 tablets (40 mg total) by mouth once daily. for 5 days     Dispense:  10 tablet     Refill:  0        No follow-ups on file.      E-Visit Time Trackin minutes     Acute on chronic respiratory failure with hypoxia and hypercapnia  Bronchitis  -     cefdinir (OMNICEF) 300 MG capsule; Take 1 capsule (300 mg total) by mouth 2 (two) times daily. for 5 days  Dispense: 10 capsule; Refill: 0  -     predniSONE (DELTASONE) 20 MG tablet; Take 2 tablets (40 mg total) by mouth once daily. for 5 days  Dispense: 10 tablet; Refill: 0  -     benzonatate (TESSALON) 200 MG capsule; Take 1 capsule (200 mg total) by mouth 3 (three) times daily as needed for Cough.  Dispense: 30 capsule; Refill: 0  -     guaiFENesin (MUCINEX) 600 mg 12 hr tablet; Take 2 tablets (1,200 mg total) by mouth 2 (two) times daily. for 10 days  Dispense: 40 tablet; Refill: 0  - Will repeat prior treatment from hospital stay as  patient is high risk for hospital admission with low lung reserves  - Present to ED if severely fatigued, respiratory distress develops, or other immediate concerns

## 2025-05-13 ENCOUNTER — PATIENT MESSAGE (OUTPATIENT)
Dept: PRIMARY CARE CLINIC | Facility: CLINIC | Age: 60
End: 2025-05-13
Payer: COMMERCIAL

## 2025-05-14 ENCOUNTER — TELEPHONE (OUTPATIENT)
Dept: PULMONOLOGY | Facility: CLINIC | Age: 60
End: 2025-05-14
Payer: COMMERCIAL

## 2025-05-16 ENCOUNTER — TELEPHONE (OUTPATIENT)
Dept: NEPHROLOGY | Facility: CLINIC | Age: 60
End: 2025-05-16
Payer: COMMERCIAL

## 2025-05-23 ENCOUNTER — OFFICE VISIT (OUTPATIENT)
Dept: PULMONOLOGY | Facility: CLINIC | Age: 60
End: 2025-05-23
Payer: COMMERCIAL

## 2025-05-23 VITALS
RESPIRATION RATE: 11 BRPM | HEIGHT: 72 IN | SYSTOLIC BLOOD PRESSURE: 130 MMHG | BODY MASS INDEX: 42.66 KG/M2 | DIASTOLIC BLOOD PRESSURE: 70 MMHG | WEIGHT: 315 LBS | OXYGEN SATURATION: 90 % | HEART RATE: 52 BPM

## 2025-05-23 DIAGNOSIS — J96.11 CHRONIC RESPIRATORY FAILURE WITH HYPOXIA AND HYPERCAPNIA: Primary | ICD-10-CM

## 2025-05-23 DIAGNOSIS — I50.32 CHRONIC HEART FAILURE WITH PRESERVED EJECTION FRACTION: ICD-10-CM

## 2025-05-23 DIAGNOSIS — E66.2 OBESITY HYPOVENTILATION SYNDROME: ICD-10-CM

## 2025-05-23 DIAGNOSIS — J96.12 CHRONIC RESPIRATORY FAILURE WITH HYPOXIA AND HYPERCAPNIA: Primary | ICD-10-CM

## 2025-05-23 PROCEDURE — 99999 PR PBB SHADOW E&M-EST. PATIENT-LVL IV: CPT | Mod: PBBFAC,,, | Performed by: INTERNAL MEDICINE

## 2025-05-23 NOTE — PROGRESS NOTES
Follow up  [facility]    SUBJECTIVE:     Chief Complaint:   Shortness of breath    History of Present Illness:  Farhan Casillas is a 60 y.o. male who presents for follow up of shortness of breath. The patient reports chronic dyspnea which has been worse over the past two months. +orthopnea. +edema bilateral legs. No cough or fevers. Pt uses home oxygen. He reports good adherence to this.     The patient has had five hospitalizations for respiratory compromise in the past year.     He has his home bipap machine now & is adhering to it. He feels like it is helping him a lot. He reports good adherence to this.    Pt reports no personal history of lung disease.    Pt is a lifetime nonsmoker. No childhood asthma. Has a cat & a dog. No birds.     Pt is no longer working.    PMH: chronic combined heart failure, lymphedema    Review of patient's allergies indicates:   Allergen Reactions    Levofloxacin Itching       Past Medical History:   Diagnosis Date    Acute respiratory failure 12/17/2023    Arthritis     Bacterial pneumonia 09/2023    CHF (congestive heart failure)     Diabetes mellitus, type 2     Hyperlipidemia     Hypertension      Past Surgical History:   Procedure Laterality Date    ADENOIDECTOMY  1975    APPENDECTOMY  1995    TONSILLECTOMY  1975     Family History   Problem Relation Name Age of Onset    Arthritis Mother Patricia casillas     Cancer Mother Patricia casillas         lymph node     Hypertension Mother Patricia casillas     Cancer Father Abel Casillas         lung     Social History     Socioeconomic History    Marital status:     Number of children: 2   Occupational History    Occupation: Retail Sales    Tobacco Use    Smoking status: Never    Smokeless tobacco: Never   Substance and Sexual Activity    Alcohol use: No    Drug use: No     Social Drivers of Health     Financial Resource Strain: Medium Risk (3/26/2025)    Overall Financial Resource Strain (CARDIA)     Difficulty of Paying Living  Expenses: Somewhat hard   Food Insecurity: No Food Insecurity (3/26/2025)    Hunger Vital Sign     Worried About Running Out of Food in the Last Year: Never true     Ran Out of Food in the Last Year: Never true   Transportation Needs: No Transportation Needs (3/26/2025)    PRAPARE - Transportation     Lack of Transportation (Medical): No     Lack of Transportation (Non-Medical): No   Physical Activity: Inactive (1/16/2025)    Exercise Vital Sign     Days of Exercise per Week: 0 days     Minutes of Exercise per Session: 0 min   Stress: Stress Concern Present (3/26/2025)    [employer] - Occupational Stress Questionnaire     Feeling of Stress : To some extent   Housing Stability: Low Risk  (3/26/2025)    Housing Stability Vital Sign     Unable to Pay for Housing in the Last Year: No     Homeless in the Last Year: No     Review of Systems:  Cardiac: leg swelling & orthopnea  Musculoskeletal: +lymphedema  Resp: +dyspnea  Hem: no bleeding  ENT: no stridor  Rheum: no autoimmune disease  Psych: +anxiety when he feels short of breath  Endocrine: Pt has a goal for weight loss. Considering ozempic as a tx.    OBJECTIVE:     Vital Signs  Vitals:    05/23/25 0819   BP: 130/70   BP Location: Left arm   Patient Position: Sitting   Pulse: (!) 52   Resp: 11   SpO2: (!) 90%   Weight: (!) 158.8 kg (350 lb)   Height: 6' (1.829 m)     Body mass index is 47.47 kg/m².    Physical Exam:  General: no distress  Eyes:  conjunctivae/corneas clear  Nose: no discharge  Neck: trachea midline with no masses appreciated  Lungs:  normal respiratory effort, no wheezes, no rales, breath sounds are diminished bilaterally at bases  Heart: regular rate and rhythm and no murmur  Abdomen: non-distended  Extremities: no cyanosis, +lymphedema left leg & charcot joint, no clubbing  Skin: No rashes or lesions. good skin turgor  Neurologic: alert, oriented, thought content appropriate    Laboratory:  Lab Results   Component Value  Date    WBC 8.11 03/26/2025    HGB 12.0 (L) 03/26/2025    HCT 40.3 03/26/2025    MCV 77 (L) 03/26/2025     03/26/2025     Chest Imaging, My Impression:   CT Chest 1/2025: bilateral pleural effusion R > L. Bilateral basilar atelectasis R>L. Numerous small calcifications within the lung parenchyma.    Diagnostic Results:  TTE 11/2024:  Left Ventricle The left ventricle is mildly dilated. Severely increased ventricular mass. Increased wall thickness. There is moderate concentric hypertrophy. Moderate global hypokinesis present. There is moderately reduced systolic function. Ejection fraction is approximately 35%. Grade II diastolic dysfunction.   Right Ventricle Normal right ventricular cavity size. Right ventricle wall motion  is normal. Systolic function is normal.   Left Atrium Left atrium is moderately dilated.   Right Atrium Normal right atrial size.   Aortic Valve The aortic valve is a trileaflet valve. There is moderate aortic valve sclerosis. Mildly restricted motion. Aortic valve peak velocity is 1.2 m/s. Mean gradient is 3.3 mmHg.   Mitral Valve The mitral valve is structurally normal. There is normal leaflet mobility. The mean pressure gradient across the mitral valve is 2 mmHg at a heart rate of  bpm. There is trace regurgitation.   Tricuspid Valve The tricuspid valve is structurally normal. There is normal leaflet mobility. There is trace regurgitation.   Pulmonic Valve The pulmonic valve is structurally normal. There is normal leaflet mobility.   IVC/SVC Normal venous pressure at 3 mmHg.   Ascending Aorta Aortic root is normal in size. Ascending aorta is normal measuring 2.71 cm.   Pericardium and Other Findings There is no pericardial effusion.   Pulmonary Artery Pulmonary artery pressure could not be accurately determined.     PFT Today: no obstruction, +very severe restriction, DLCO is diminished.    ASSESSMENT/PLAN:     Chronic hypoxemic & hypercapnic respiratory failure  - Continue  supplemental oxygen.  - Continue bipap use.  - Provided education & counseling.    Obesity hypoventilation syndrome with BMI 50  - Counseled patient on weight loss & lung function.   - He has benefited significantly from home bipap. Continue bipap.   - He is actively trying to get started on tx with ozempic. Cost has been the largest barrier so far.    Chronic combined heart failure with bilateral pleural effusions.  - Continue to follow up with cardiology. Continue diuretic.     Jean Claude Harmon MD  [facility]

## 2025-06-04 ENCOUNTER — TELEPHONE (OUTPATIENT)
Dept: NEPHROLOGY | Facility: CLINIC | Age: 60
End: 2025-06-04
Payer: COMMERCIAL

## 2025-06-04 PROCEDURE — 84156 ASSAY OF PROTEIN URINE: CPT | Performed by: NURSE PRACTITIONER

## 2025-06-06 ENCOUNTER — OFFICE VISIT (OUTPATIENT)
Dept: NEPHROLOGY | Facility: CLINIC | Age: 60
End: 2025-06-06
Payer: COMMERCIAL

## 2025-06-06 ENCOUNTER — RESULTS FOLLOW-UP (OUTPATIENT)
Dept: PHARMACY | Facility: CLINIC | Age: 60
End: 2025-06-06

## 2025-06-06 ENCOUNTER — TELEPHONE (OUTPATIENT)
Dept: PHARMACY | Facility: CLINIC | Age: 60
End: 2025-06-06
Payer: COMMERCIAL

## 2025-06-06 VITALS
WEIGHT: 315 LBS | BODY MASS INDEX: 47.48 KG/M2 | DIASTOLIC BLOOD PRESSURE: 73 MMHG | SYSTOLIC BLOOD PRESSURE: 119 MMHG | OXYGEN SATURATION: 97 % | HEART RATE: 65 BPM

## 2025-06-06 DIAGNOSIS — E11.22 TYPE 2 DM WITH CKD STAGE 4 AND HYPERTENSION: ICD-10-CM

## 2025-06-06 DIAGNOSIS — I10 ESSENTIAL HYPERTENSION: ICD-10-CM

## 2025-06-06 DIAGNOSIS — N18.4 CKD (CHRONIC KIDNEY DISEASE) STAGE 4, GFR 15-29 ML/MIN: Primary | ICD-10-CM

## 2025-06-06 DIAGNOSIS — R80.9 PROTEINURIA, UNSPECIFIED TYPE: ICD-10-CM

## 2025-06-06 DIAGNOSIS — E55.9 VITAMIN D DEFICIENCY, UNSPECIFIED: ICD-10-CM

## 2025-06-06 DIAGNOSIS — N25.81 SECONDARY HYPERPARATHYROIDISM OF RENAL ORIGIN: ICD-10-CM

## 2025-06-06 DIAGNOSIS — E66.01 MORBID OBESITY: ICD-10-CM

## 2025-06-06 DIAGNOSIS — N18.4 TYPE 2 DM WITH CKD STAGE 4 AND HYPERTENSION: ICD-10-CM

## 2025-06-06 DIAGNOSIS — I50.42 CHRONIC COMBINED SYSTOLIC AND DIASTOLIC CONGESTIVE HEART FAILURE: ICD-10-CM

## 2025-06-06 DIAGNOSIS — I12.9 TYPE 2 DM WITH CKD STAGE 4 AND HYPERTENSION: ICD-10-CM

## 2025-06-06 PROCEDURE — 99999 PR PBB SHADOW E&M-EST. PATIENT-LVL IV: CPT | Mod: PBBFAC,,, | Performed by: NURSE PRACTITIONER

## 2025-06-06 RX ORDER — ERGOCALCIFEROL 1.25 MG/1
50000 CAPSULE ORAL
Qty: 4 CAPSULE | Refills: 2 | Status: SHIPPED | OUTPATIENT
Start: 2025-06-06 | End: 2025-08-23

## 2025-06-09 DIAGNOSIS — N18.4 CKD (CHRONIC KIDNEY DISEASE) STAGE 4, GFR 15-29 ML/MIN: Primary | ICD-10-CM

## 2025-07-08 RX ORDER — HYDRALAZINE HYDROCHLORIDE 25 MG/1
25 TABLET, FILM COATED ORAL 2 TIMES DAILY
Qty: 60 TABLET | Refills: 2 | Status: SHIPPED | OUTPATIENT
Start: 2025-07-08

## 2025-07-08 NOTE — TELEPHONE ENCOUNTER
Copied from CRM #[id number]. Topic: Medications - Medication Status Check   >> Jul 8, 2025  8:02 AM Mary wrote:  Patient would like to know what is the status on his hydrALAZINE (APRESOLINE) 25 MG tablet?

## 2025-07-08 NOTE — TELEPHONE ENCOUNTER
Care Due:                  Date            Visit Type   Department     Provider  --------------------------------------------------------------------------------                                [facility]  Last Visit: 02-      FOLLOW UP    PRIMARY CARE   Jordan Moore                               -                              [facility]  Next Visit: 07-      CARE ([facility])   PRIMARY CARE   Jordan Moore                                                            Last  Test          Frequency    Reason                     Performed    Due Date  --------------------------------------------------------------------------------    HBA1C.......  6 months...  empagliflozin............  03- 09-    Health [facility] Embedded Care Due Messages. Reference number: [id number].   7/08/2025 8:20:28 AM CDT

## 2025-08-06 ENCOUNTER — OFFICE VISIT (OUTPATIENT)
Dept: PRIMARY CARE CLINIC | Facility: CLINIC | Age: 60
End: 2025-08-06
Payer: COMMERCIAL

## 2025-08-06 VITALS
HEIGHT: 72 IN | HEART RATE: 63 BPM | DIASTOLIC BLOOD PRESSURE: 60 MMHG | BODY MASS INDEX: 47.48 KG/M2 | OXYGEN SATURATION: 96 % | RESPIRATION RATE: 18 BRPM | SYSTOLIC BLOOD PRESSURE: 112 MMHG

## 2025-08-06 DIAGNOSIS — I10 ESSENTIAL HYPERTENSION: Primary | Chronic | ICD-10-CM

## 2025-08-06 DIAGNOSIS — E11.9 INSULIN DEPENDENT TYPE 2 DIABETES MELLITUS: ICD-10-CM

## 2025-08-06 DIAGNOSIS — Z79.4 INSULIN DEPENDENT TYPE 2 DIABETES MELLITUS: ICD-10-CM

## 2025-08-06 DIAGNOSIS — E78.2 MIXED HYPERLIPIDEMIA: Chronic | ICD-10-CM

## 2025-08-06 PROCEDURE — 99214 OFFICE O/P EST MOD 30 MIN: CPT | Mod: S$GLB,,, | Performed by: STUDENT IN AN ORGANIZED HEALTH CARE EDUCATION/TRAINING PROGRAM

## 2025-08-06 PROCEDURE — 99999 PR PBB SHADOW E&M-EST. PATIENT-LVL IV: CPT | Mod: PBBFAC,,, | Performed by: STUDENT IN AN ORGANIZED HEALTH CARE EDUCATION/TRAINING PROGRAM

## 2025-08-06 RX ORDER — ASPIRIN 81 MG/1
81 TABLET ORAL DAILY
Qty: 90 TABLET | Refills: 3 | Status: SHIPPED | OUTPATIENT
Start: 2025-08-06

## 2025-08-06 RX ORDER — HYDRALAZINE HYDROCHLORIDE 25 MG/1
75 TABLET, FILM COATED ORAL 2 TIMES DAILY
Qty: 540 TABLET | Refills: 3 | Status: SHIPPED | OUTPATIENT
Start: 2025-08-06

## 2025-08-06 RX ORDER — SAXAGLIPTIN 5 MG/1
5 TABLET, FILM COATED ORAL DAILY
Qty: 90 TABLET | Refills: 3 | Status: SHIPPED | OUTPATIENT
Start: 2025-08-06 | End: 2026-08-06

## 2025-08-06 RX ORDER — ATORVASTATIN CALCIUM 80 MG/1
80 TABLET, FILM COATED ORAL DAILY
Qty: 15 TABLET | Refills: 0 | Status: SHIPPED | OUTPATIENT
Start: 2025-08-06

## 2025-08-06 RX ORDER — LISINOPRIL 40 MG/1
40 TABLET ORAL DAILY
Qty: 30 TABLET | Refills: 11 | Status: SHIPPED | OUTPATIENT
Start: 2025-08-06 | End: 2026-08-06

## 2025-08-06 NOTE — PROGRESS NOTES
Subjective:       Patient ID: Farhan Crenshaw is a 60 y.o. male.    Chief Complaint: Follow-up      HPI:  60 y.o. male presents to [facility] for follow-up visit    Acute concerns?: None, oxygen has been improved. Following with Farhan Barbosa for Cardiology in [facility]. Same location where foot surgery was performed.    Most recent HbA1c:   Lab Results   Component Value Date    HGBA1C 6.4 (H) 03/25/2025     Home Reads?: Usually around 140, sometimes high 90s  Current Meds: Jardiance 10 mg  On Insulin?: Yes, insulin glargine 10 U daily  Compliance: Rarely misses  Diet: No specific  Exercises: Limited with oxygen and foot    Podiatrist evaluated this week.    Surgery was 1 year ago in June. Has had 3 visits.    DM eye photo?: Has at [facility], last saw 7 months ago    Following with Podiatry    Review of Systems   Constitutional:  Negative for chills, diaphoresis, fatigue and fever.   HENT:  Negative for congestion, sinus pressure, sneezing and sore throat.    Respiratory:  Positive for shortness of breath (On supplemental O2, walking will still drop). Negative for cough.    Cardiovascular:  Negative for chest pain and palpitations.   Gastrointestinal:  Negative for abdominal pain, diarrhea, nausea and vomiting.   Musculoskeletal:  Positive for arthralgias (Nothing needing intervention. Occurs intermittently in ankles and years). Negative for joint swelling and myalgias.   Skin:  Positive for wound (Left foot). Negative for rash.   Neurological:  Negative for dizziness, light-headedness and headaches.       Objective:      Vitals:    08/06/25 0833   BP: 112/60   BP Location: Right arm   Patient Position: Sitting   Pulse: 63   Resp: 18   SpO2: 96%   Height: 6' (1.829 m)     Physical Exam  Vitals reviewed.   Constitutional:       General: He is not in acute distress.     Appearance: Normal appearance. He is not ill-appearing.   HENT:      Head: Normocephalic and atraumatic.   Eyes:       General:         Right eye: No discharge.         Left eye: No discharge.      Conjunctiva/sclera: Conjunctivae normal.   Cardiovascular:      Rate and Rhythm: Normal rate and regular rhythm.      Pulses: Normal pulses.      Heart sounds: No murmur heard.     Comments: Wrap present to LLE  Pulmonary:      Effort: Pulmonary effort is normal.      Breath sounds: Normal breath sounds.   Musculoskeletal:         General: No deformity.      Cervical back: Neck supple. No rigidity.      Right lower le+ Pitting Edema present.      Left lower le+ Edema present.   Lymphadenopathy:      Cervical: No cervical adenopathy.   Skin:     General: Skin is warm and dry.      Coloration: Skin is not jaundiced.   Neurological:      General: No focal deficit present.      Mental Status: He is alert and oriented to person, place, and time.   Psychiatric:         Mood and Affect: Mood normal.         Behavior: Behavior normal.             Lab Results   Component Value Date     2025     2025    K 4.1 2025    K 4.3 2025     2025    CL 98 2025    CO2 30 (H) 2025    CO2 28 2025    BUN 54 (H) 2025    CREATININE 2.5 (H) 2025    ANIONGAP 11 2025     Lab Results   Component Value Date    HGBA1C 6.4 (H) 2025    HGBA1C 7.5 (H) 2024     Lab Results   Component Value Date     (H) 2025    BNP 56 2025    BNP 88 2025     (H) 2025     (H) 01/15/2025       Lab Results   Component Value Date    WBC 8.11 2025    HGB 12.0 (L) 2025    HGB 11.1 (L) 2025    HCT 40.3 2025    HCT 37.1 (L) 2025    HCT 39 2025     2025     2025    GRAN 6.6 2025    GRAN 88.0 (H) 2025     Lab Results   Component Value Date    CHOL 235 (H) 2024    HDL 26 (L) 2024    LDLCALC 167.0 (H) 2024    TRIG 210 (H) 2024        Current Medications[1]  "       Assessment:       1. Essential hypertension    2. Mixed hyperlipidemia           Plan:       Essential hypertension  -     hydrALAZINE (APRESOLINE) 25 MG tablet; Take 3 tablets (75 mg total) by mouth 2 (two) times a day.  Dispense: 540 tablet; Refill: 3  -     lisinopriL (PRINIVIL,ZESTRIL) 40 MG tablet; Take 1 tablet (40 mg total) by mouth once daily.  Dispense: 30 tablet; Refill: 11  -     aspirin (ECOTRIN) 81 MG EC tablet; Take 1 tablet (81 mg total) by mouth once daily.  Dispense: 90 tablet; Refill: 3    Mixed hyperlipidemia  -     atorvastatin (LIPITOR) 80 MG tablet; Take 1 tablet (80 mg total) by mouth once daily.  Dispense: 15 tablet; Refill: 0    Insulin dependent type 2 diabetes mellitus  -     SAXagliptin (ONGLYZA) 5 mg Tab tablet; Take 1 tablet (5 mg total) by mouth once daily.  Dispense: 90 tablet; Refill: 3  - Diet and exercise guidance provided today's visit  - Instructed patient to decrease insulin to 15U daily if saxagliptan is approved through insurance, consider alternative DPP4-I. Patient notified that instructions of insulin reduction when starting included in top left of discharge paperwork    RTC in 4 months         [1]   Current Outpatient Medications:     albuterol (VENTOLIN HFA) 90 mcg/actuation inhaler, Inhale 2 puffs into the lungs every 6 (six) hours as needed for Wheezing or Shortness of Breath. Rescue, Disp: 1 g, Rfl: 2    albuterol-ipratropium (DUO-NEB) 2.5 mg-0.5 mg/3 mL nebulizer solution, Take 3 mLs by nebulization every 6 (six) hours as needed for Wheezing or Shortness of Breath. Rescue, Disp: 75 mL, Rfl: 3    amLODIPine (NORVASC) 10 MG tablet, Take 1 tablet (10 mg total) by mouth once daily., Disp: 30 tablet, Rfl: 11    BASAGLAR KWIKPEN U-100 INSULIN glargine 100 units/mL (3mL) SubQ pen, INJECT 20 UNITS INTO THE SKIN ONCE DAILY., Disp: 15 Syringe, Rfl: 0    BD ULTRA-FINE KAMAR PEN NEEDLE 32 gauge x 5/32" Ndle, USE TO INJECT BASAGLAR ONCE A DAY, Disp: 100 each, Rfl: 2    " carvediloL (COREG) 25 MG tablet, Take 1 tablet (25 mg total) by mouth 2 (two) times daily., Disp: 60 tablet, Rfl: 11    empagliflozin (JARDIANCE) 10 mg tablet, Take 1 tablet (10 mg total) by mouth once daily., Disp: 30 tablet, Rfl: 0    ergocalciferol (ERGOCALCIFEROL) 50,000 unit Cap, Take 1 capsule (50,000 Units total) by mouth every 7 days. for 12 doses, Disp: 4 capsule, Rfl: 2    furosemide (LASIX) 80 MG tablet, Take 1 tablet twice a day for 3 days then once daily.  Increased to twice a day with increased leg swelling or shortness of breath, Disp: 180 tablet, Rfl: 3    isosorbide dinitrate (ISORDIL) 20 MG tablet, Take 20 mg by mouth 2 (two) times daily., Disp: , Rfl:     aspirin (ECOTRIN) 81 MG EC tablet, Take 1 tablet (81 mg total) by mouth once daily., Disp: 90 tablet, Rfl: 3    atorvastatin (LIPITOR) 80 MG tablet, Take 1 tablet (80 mg total) by mouth once daily., Disp: 15 tablet, Rfl: 0    hydrALAZINE (APRESOLINE) 25 MG tablet, Take 3 tablets (75 mg total) by mouth 2 (two) times a day., Disp: 540 tablet, Rfl: 3    lisinopriL (PRINIVIL,ZESTRIL) 40 MG tablet, Take 1 tablet (40 mg total) by mouth once daily., Disp: 30 tablet, Rfl: 11    SAXagliptin (ONGLYZA) 5 mg Tab tablet, Take 1 tablet (5 mg total) by mouth once daily., Disp: 90 tablet, Rfl: 3

## 2025-08-20 ENCOUNTER — TELEPHONE (OUTPATIENT)
Dept: NEPHROLOGY | Facility: CLINIC | Age: 60
End: 2025-08-20
Payer: COMMERCIAL

## 2025-09-04 ENCOUNTER — TELEPHONE (OUTPATIENT)
Dept: NEPHROLOGY | Facility: CLINIC | Age: 60
End: 2025-09-04
Payer: COMMERCIAL